# Patient Record
Sex: FEMALE | Race: BLACK OR AFRICAN AMERICAN | Employment: UNEMPLOYED | URBAN - METROPOLITAN AREA
[De-identification: names, ages, dates, MRNs, and addresses within clinical notes are randomized per-mention and may not be internally consistent; named-entity substitution may affect disease eponyms.]

---

## 2018-10-04 ENCOUNTER — OFFICE VISIT (OUTPATIENT)
Dept: DERMATOLOGY | Age: 28
End: 2018-10-04
Payer: MEDICARE

## 2018-10-04 VITALS
HEIGHT: 72 IN | DIASTOLIC BLOOD PRESSURE: 81 MMHG | SYSTOLIC BLOOD PRESSURE: 127 MMHG | OXYGEN SATURATION: 97 % | HEART RATE: 85 BPM | WEIGHT: 215.6 LBS | BODY MASS INDEX: 29.2 KG/M2

## 2018-10-04 DIAGNOSIS — R22.0 MASS OF SCALP: Primary | ICD-10-CM

## 2018-10-04 PROCEDURE — G8419 CALC BMI OUT NRM PARAM NOF/U: HCPCS | Performed by: DERMATOLOGY

## 2018-10-04 PROCEDURE — 1036F TOBACCO NON-USER: CPT | Performed by: DERMATOLOGY

## 2018-10-04 PROCEDURE — G8484 FLU IMMUNIZE NO ADMIN: HCPCS | Performed by: DERMATOLOGY

## 2018-10-04 PROCEDURE — 99202 OFFICE O/P NEW SF 15 MIN: CPT | Performed by: DERMATOLOGY

## 2018-10-04 PROCEDURE — G8427 DOCREV CUR MEDS BY ELIG CLIN: HCPCS | Performed by: DERMATOLOGY

## 2018-10-04 RX ORDER — FLUTICASONE PROPIONATE 50 MCG
SPRAY, SUSPENSION (ML) NASAL
Refills: 1 | COMMUNITY
Start: 2018-08-03 | End: 2018-10-11 | Stop reason: ALTCHOICE

## 2018-10-04 NOTE — PROGRESS NOTES
documented in clinic note below. Acne exam, which includes the head/face, neck, chest, and back was performed    Pertinent Physical Exam Findings:  Physical Exam   Skin:            Medical Necessity of Exam Performed:   Distribution of patient concerns    Additional Diagnostic Testing performed during exam: Not performed ,  Not performed    ASSESSMENT:   Diagnosis Orders   1. Mass of scalp  AFL Jason Limon MD       Plan of Action is as Follows:  Assessment    1. Mass of scalp  - counseled patient that is likely a cyst or lipoma, but almost certainly benign  - observation would be a good option but patients want it gone. Given potential subfrontalis location will refer to plastic surgery  - Jason Limon MD          Patient Instructions   Referral to plastic surgery to remove possible cyst.       Photo surveillance performed: No    Follow-up: No Follow-up on file. This note was created with the assistance of a speech-recognition program.  Although the intention is to generate a document that actually reflects the content of the visit, no guarantees can be provided that every mistake has been identified and corrected by editing.     Electronically signed by Kaci Ulloa MD on 10/4/18 at 3:44 PM

## 2018-10-04 NOTE — LETTER
[facility]) Dermatology   [address] [place] [address]  [address]  [address] [place] [address] [address]  Phone: [phone]  Fax: [phone]     Topher Gonzales MD       October 4, 2018     No referring provider defined for this encounter. Patient: Espinoza Leonard   MR Number: [id number]   YOB: 1990   Date of Visit: 10/4/2018     Dear Dr. Lizzie Villagomez ref. provider found: Thank you for the request for consultation for Ms. Robson Potts to me for evaluation. Below are the relevant portions of my assessment and plan of care. 1. Mass of scalp  - counseled patient that is likely a cyst or lipoma, but almost certainly benign  - observation would be a good option but patients want it gone. Given potential subfrontalis location will refer to plastic surgery  - Irene Walters MD       Patient Instructions   Referral to plastic surgery to remove possible cyst.         If you have questions, please do not hesitate to call me. I look forward to following [place] along with you.     Sincerely,        Topher Gonzales MD

## 2018-10-17 ENCOUNTER — TELEPHONE (OUTPATIENT)
Dept: DERMATOLOGY | Age: 28
End: 2018-10-17

## 2018-10-17 NOTE — TELEPHONE ENCOUNTER
Pt informed and understands. I went to transfer her to scheduling but she hung up on me before I could do that.

## 2018-10-24 ENCOUNTER — TELEPHONE (OUTPATIENT)
Dept: DERMATOLOGY | Age: 28
End: 2018-10-24

## 2018-10-24 DIAGNOSIS — L72.9 SCALP CYST: Primary | ICD-10-CM

## 2020-08-20 PROBLEM — Z00.00 ENCOUNTER FOR PREVENTIVE HEALTH EXAMINATION: Status: ACTIVE | Noted: 2020-08-20

## 2020-08-24 ENCOUNTER — APPOINTMENT (OUTPATIENT)
Dept: NEUROSURGERY | Facility: CLINIC | Age: 30
End: 2020-08-24
Payer: SELF-PAY

## 2020-08-24 VITALS — BODY MASS INDEX: 33.86 KG/M2 | HEIGHT: 72 IN | WEIGHT: 250 LBS

## 2020-08-24 DIAGNOSIS — Z78.9 OTHER SPECIFIED HEALTH STATUS: ICD-10-CM

## 2020-08-24 DIAGNOSIS — Z82.3 FAMILY HISTORY OF STROKE: ICD-10-CM

## 2020-08-24 DIAGNOSIS — H93.A9 PULSATILE TINNITUS, UNSPECIFIED EAR: ICD-10-CM

## 2020-08-24 PROCEDURE — 99202 OFFICE O/P NEW SF 15 MIN: CPT | Mod: 95

## 2020-08-24 NOTE — REASON FOR VISIT
[New Patient Visit] : a new patient visit [Referred By: _________] : Patient was referred by RICKEY [Other: _____] : [unfilled]

## 2020-08-26 NOTE — PHYSICAL EXAM
[General Appearance - Alert] : alert [General Appearance - Well Nourished] : well nourished [General Appearance - In No Acute Distress] : in no acute distress [General Appearance - Well Developed] : well developed [General Appearance - Well-Appearing] : healthy appearing [Oriented To Time, Place, And Person] : oriented to person, place, and time [Impaired Insight] : insight and judgment were intact [Affect] : the affect was normal [Person] : oriented to person [Memory Recent] : recent memory was not impaired [Place] : oriented to place [Time] : oriented to time [Exaggerated Use Of Accessory Muscles For Inspiration] : no accessory muscle use [] : no respiratory distress [FreeTextEntry1] : ringing in ears

## 2020-08-26 NOTE — HISTORY OF PRESENT ILLNESS
[> 3 months] : more  than 3 months [de-identified] : Geeta Mark is a pleasant 30 year old right handed lady who presents for consultation regarding bilateral pulsatile tinnitus which has been present since 2013 however symptom has increased in the past year.  She describes the sounds as "whooshing" and sounds "like I'm under water".  She denies a thrill.  \par \par She states that she had US Duplex of carotid arteries with normal results.  She states that sometimes she thinks that sounds are louder on the right side but she is not sure.\par \par She denies headaches, blurry vision, weight changes.\par \par She thinks that when she tilts her head back or position her head in a "weird way" not to hear it.  When she presses on her neck she hears the sound on the opposite side and stops on the side she is pressing.\par \par She lives in [place], [place].\par \par telehealth visit started at 3:35 pm - 4:18 pm\par  [FreeTextEntry1] : "Pulsatile tinnitus"

## 2020-08-26 NOTE — ASSESSMENT
[FreeTextEntry1] : IMPRESSION:\par 1. Bilateral pulsatile tinnitus\par \par The patient is reluctant to have contrast enhanced studies and would rather have non-contrast scans.\par \par PLAN:\par 1. MRA/MRV w/o contrast.\par 2. Patient will obtain imaging and send to us.

## 2021-02-16 ENCOUNTER — HOSPITAL ENCOUNTER (EMERGENCY)
Age: 31
Discharge: HOME | End: 2021-02-16
Attending: EMERGENCY MEDICINE
Payer: MEDICARE

## 2021-02-16 VITALS
BODY MASS INDEX: 31.15 KG/M2 | RESPIRATION RATE: 18 BRPM | SYSTOLIC BLOOD PRESSURE: 116 MMHG | WEIGHT: 230 LBS | HEIGHT: 72 IN | HEART RATE: 80 BPM | OXYGEN SATURATION: 98 % | DIASTOLIC BLOOD PRESSURE: 83 MMHG | TEMPERATURE: 97.7 F

## 2021-02-16 DIAGNOSIS — R44.3 HALLUCINATIONS: Primary | ICD-10-CM

## 2021-02-16 LAB
ABSOLUTE EOS #: <0.03 K/UL (ref 0–0.44)
ABSOLUTE IMMATURE GRANULOCYTE: <0.03 K/UL (ref 0–0.3)
ABSOLUTE LYMPH #: 1.62 K/UL (ref 1.1–3.7)
ABSOLUTE MONO #: 0.72 K/UL (ref 0.1–1.2)
ACETAMINOPHEN LEVEL: <5 UG/ML (ref 10–30)
ALBUMIN SERPL-MCNC: 4.3 G/DL (ref 3.5–5.2)
ALBUMIN/GLOBULIN RATIO: 1.1 (ref 1–2.5)
ALP BLD-CCNC: 67 U/L (ref 35–104)
ALT SERPL-CCNC: 9 U/L (ref 5–33)
ANION GAP SERPL CALCULATED.3IONS-SCNC: 11 MMOL/L (ref 9–17)
AST SERPL-CCNC: 16 U/L
BASOPHILS # BLD: 0 % (ref 0–2)
BASOPHILS ABSOLUTE: 0.03 K/UL (ref 0–0.2)
BILIRUB SERPL-MCNC: 0.53 MG/DL (ref 0.3–1.2)
BUN BLDV-MCNC: 10 MG/DL (ref 6–20)
BUN/CREAT BLD: ABNORMAL (ref 9–20)
CALCIUM SERPL-MCNC: 9.1 MG/DL (ref 8.6–10.4)
CHLORIDE BLD-SCNC: 102 MMOL/L (ref 98–107)
CO2: 22 MMOL/L (ref 20–31)
CREAT SERPL-MCNC: 0.75 MG/DL (ref 0.5–0.9)
DIFFERENTIAL TYPE: ABNORMAL
EOSINOPHILS RELATIVE PERCENT: 0 % (ref 1–4)
ETHANOL PERCENT: <0.01 %
ETHANOL: <10 MG/DL
GFR AFRICAN AMERICAN: >60 ML/MIN
GFR NON-AFRICAN AMERICAN: >60 ML/MIN
GFR SERPL CREATININE-BSD FRML MDRD: ABNORMAL ML/MIN/{1.73_M2}
GFR SERPL CREATININE-BSD FRML MDRD: ABNORMAL ML/MIN/{1.73_M2}
GLUCOSE BLD-MCNC: 105 MG/DL (ref 70–99)
HCG QUALITATIVE: NEGATIVE
HCT VFR BLD CALC: 35.3 % (ref 36.3–47.1)
HEMOGLOBIN: 12.4 G/DL (ref 11.9–15.1)
IMMATURE GRANULOCYTES: 0 %
LYMPHOCYTES # BLD: 16 % (ref 24–43)
MAGNESIUM: 2.1 MG/DL (ref 1.6–2.6)
MCH RBC QN AUTO: 28.4 PG (ref 25.2–33.5)
MCHC RBC AUTO-ENTMCNC: 35.1 G/DL (ref 28.4–34.8)
MCV RBC AUTO: 80.8 FL (ref 82.6–102.9)
MONOCYTES # BLD: 7 % (ref 3–12)
NRBC AUTOMATED: 0 PER 100 WBC
PDW BLD-RTO: 13.3 % (ref 11.8–14.4)
PLATELET # BLD: 223 K/UL (ref 138–453)
PLATELET ESTIMATE: ABNORMAL
PMV BLD AUTO: 11.5 FL (ref 8.1–13.5)
POTASSIUM SERPL-SCNC: 3.2 MMOL/L (ref 3.7–5.3)
RBC # BLD: 4.37 M/UL (ref 3.95–5.11)
RBC # BLD: ABNORMAL 10*6/UL
SALICYLATE LEVEL: <1 MG/DL (ref 3–10)
SEG NEUTROPHILS: 77 % (ref 36–65)
SEGMENTED NEUTROPHILS ABSOLUTE COUNT: 7.65 K/UL (ref 1.5–8.1)
SODIUM BLD-SCNC: 135 MMOL/L (ref 135–144)
TOTAL PROTEIN: 8.2 G/DL (ref 6.4–8.3)
TOXIC TRICYCLIC SC,BLOOD: NEGATIVE
WBC # BLD: 10.1 K/UL (ref 3.5–11.3)
WBC # BLD: ABNORMAL 10*3/UL

## 2021-02-16 PROCEDURE — 80143 DRUG ASSAY ACETAMINOPHEN: CPT

## 2021-02-16 PROCEDURE — 83735 ASSAY OF MAGNESIUM: CPT

## 2021-02-16 PROCEDURE — 85025 COMPLETE CBC W/AUTO DIFF WBC: CPT

## 2021-02-16 PROCEDURE — 84703 CHORIONIC GONADOTROPIN ASSAY: CPT

## 2021-02-16 PROCEDURE — 80179 DRUG ASSAY SALICYLATE: CPT

## 2021-02-16 PROCEDURE — 80053 COMPREHEN METABOLIC PANEL: CPT

## 2021-02-16 PROCEDURE — 99284 EMERGENCY DEPT VISIT MOD MDM: CPT

## 2021-02-16 PROCEDURE — G0480 DRUG TEST DEF 1-7 CLASSES: HCPCS

## 2021-02-16 PROCEDURE — 80307 DRUG TEST PRSMV CHEM ANLYZR: CPT

## 2021-02-16 ASSESSMENT — ENCOUNTER SYMPTOMS
VOMITING: 0
DIARRHEA: 0
WHEEZING: 0
BACK PAIN: 1
SHORTNESS OF BREATH: 0
NAUSEA: 0

## 2021-02-16 NOTE — ED NOTES
Patient and father out at nurse station.   States that      Marleen, [employer], RN  02/16/21 4036

## 2021-02-16 NOTE — ED PROVIDER NOTES
[facility]     Emergency Department     Faculty Attestation    I performed a history and physical examination of the patient and discussed management with the resident. I reviewed the residents note and agree with the documented findings and plan of care. Any areas of disagreement are noted on the chart. I was personally present for the key portions of any procedures. I have documented in the chart those procedures where I was not present during the key portions. I have reviewed the emergency nurses triage note. I agree with the chief complaint, past medical history, past surgical history, allergies, medications, social and family history as documented unless otherwise noted below. For Physician Assistant/ Nurse Practitioner cases/documentation I have personally evaluated this patient and have completed at least one if not all key elements of the E/M (history, physical exam, and MDM). Additional findings are as noted. I have personally seen and evaluated the patient. I find the patient's history and physical exam are consistent with the NP/PA documentation. I agree with the care provided, treatment rendered, disposition and follow-up plan. 51-year-old female presenting for mental health evaluation. Also complaining of intermittent back pain. Believes that she may have a kidney stone, no history of the same. No dysuria. No back pain currently. Also asking for a brain MRI to make sure \"everything is okay\". No headaches or dizziness. Hearing voices. Exam:  General: Laying on the bed and with anxious affect  CV: normal rate and regular rhythm  Lungs: Breathing comfortably on room air with no tachypnea, hypoxia, or increased work of breathing    Plan:  Labs including CBC, electrolytes, ED tox, UA, urine pregnancy test  Anticipate admission to inpatient psychiatric facility    Labs with no significant abnormalities.   Patient wanted to leave, patient's parents are also in the room. Concern for patient's ability to care for herself given paranoia. Resident discussed risks and benefits of leaving and patient was discharged AMA in the care of her parents.         Tram Armenta MD   Attending Emergency  Physician    (Please note that portions of this note were completed with a voice recognition program. Efforts were made to edit the dictations but occasionally words are mis-transcribed.)              Tram Armenta MD  02/16/21 1740

## 2021-02-16 NOTE — ED PROVIDER NOTES
[facility]  ED  Emergency Department Encounter  EmergencyMedicine Resident     Pt James Shane  MRN: [id number]  [place] 1990  Date of evaluation: 2/16/21  PCP:  Gabby Garzon DO    CHIEF COMPLAINT       Chief Complaint   Patient presents with    Psychiatric Evaluation     hearing voices that others don't hear, anxious, past psyc history, very paranoid    Back Pain     lower mid \"wants mri\"        HISTORY OF PRESENT ILLNESS  (Location/Symptom, Timing/Onset, Context/Setting, Quality, Duration, Modifying Factors, Severity.)      Vashti Newell is a 31 y.o. female history of prior psychiatric admission for acute psychosis presenting today with multiple complaints including auditory and visual hallucinations. Patient states that she has been seeing people at her house that are not actually there, stating that they have been intermittently talking her. Patient also reporting hearing strange tapping noises on her windows and doors outside. Patient also states that she is having obsessive thoughts about legally changing her name. Patient has past history of previous psychiatric admission at which time she was diagnosed with nonspecific psychosis. Denies any suicidal or homicidal ideations at this time. Patient also stating that she has back pain and believes she has a kidney stone because she was once told that back pain often occurs with kidney stones. Denies any fever, chills, hematuria, dysuria, vaginal bleeding, vaginal discharge, nausea, vomiting, diarrhea    PAST MEDICAL / SURGICAL / SOCIAL / FAMILY HISTORY      has a past medical history of Body piercing, Uses contact lenses, and Wears glasses. has a past surgical history that includes Hand surgery (Left, 2010) and Frenulectomy.       Social History     Socioeconomic History    Marital status: Single     Spouse name: Not on file    Number of children: Not on file    Years of education: Not on file    Highest education level: Not on file   Occupational History    Not on file   Social Needs    Financial resource strain: Not on file    Food insecurity     Worry: Not on file     Inability: Not on file    Transportation needs     Medical: Not on file     Non-medical: Not on file   Tobacco Use    Smoking status: Never Smoker    Smokeless tobacco: Never Used   Substance and Sexual Activity    Alcohol use: No    Drug use: Not Currently     Types: Marijuana     Comment: occasional marijuana smoking or eating    Sexual activity: Yes     Partners: Male   Lifestyle    Physical activity     Days per week: Not on file     Minutes per session: Not on file    Stress: Not on file   Relationships    Social connections     Talks on phone: Not on file     Gets together: Not on file     Attends Anabaptism service: Not on file     Active member of club or organization: Not on file     Attends meetings of clubs or organizations: Not on file     Relationship status: Not on file    Intimate partner violence     Fear of current or ex partner: Not on file     Emotionally abused: Not on file     Physically abused: Not on file     Forced sexual activity: Not on file   Other Topics Concern    Not on file   Social History Narrative    Not on file       Family History   Problem Relation Age of Onset    Breast Cancer Other     Lung Cancer Other     Heart Disease Other        Allergies:  Latex, Honey, and Bee venom    Home Medications:  Prior to Admission medications    Medication Sig Start Date End Date Taking? Authorizing Provider   cephALEXin (KEFLEX) 250 MG capsule Take one PO QID. Start day before surgery. 7/24/19   Rodrigo Hilario MD   Misc Natural Products (HERBAL ENERGY COMPLEX PO) Take 1 capsule by mouth daily [facility] (active hexose correlated compound, from mushroom) capsules    Historical Provider, MD   lurasidone (LATUDA) 40 MG TABS tablet Take 1 tablet by mouth daily.  4/21/14   Deanne Villarreal MD       REVIEW OF SYSTEMS (2-9 systems for level 4, 10 or more for level 5)      Review of Systems   Constitutional: Negative for fatigue and fever. Eyes: Negative for visual disturbance. Respiratory: Negative for shortness of breath and wheezing. Cardiovascular: Negative for chest pain and leg swelling. Gastrointestinal: Negative for diarrhea, nausea and vomiting. Endocrine: Negative for polydipsia and polyuria. Genitourinary: Negative for dysuria, flank pain and pelvic pain. Musculoskeletal: Positive for back pain. Negative for neck pain and neck stiffness. Skin: Negative for rash and wound. Neurological: Negative for seizures, weakness, numbness and headaches. Psychiatric/Behavioral: Positive for hallucinations. Negative for confusion. The patient is nervous/anxious. PHYSICAL EXAM   (up to 7 for level 4, 8 or more for level 5)      INITIAL VITALS:   /83   Pulse 80   Temp 97.7 °F (36.5 °C) (Infrared)   Resp 18   Ht 6' (1.829 m)   Wt 230 lb (104.3 kg)   LMP 02/01/2021 (Approximate)   SpO2 98%   BMI 31.19 kg/m²     Physical Exam  Constitutional:       General: She is not in acute distress. Appearance: She is well-developed. She is not diaphoretic. HENT:      Head: Normocephalic. Right Ear: External ear normal.      Left Ear: External ear normal.      Mouth/Throat:      Pharynx: No oropharyngeal exudate. Eyes:      General: No scleral icterus. Right eye: No discharge. Left eye: No discharge. Pupils: Pupils are equal, round, and reactive to light. Neck:      Musculoskeletal: Normal range of motion and neck supple. Vascular: No JVD. Cardiovascular:      Rate and Rhythm: Normal rate and regular rhythm. Heart sounds: No murmur. No friction rub. No gallop. Pulmonary:      Effort: Pulmonary effort is normal. No respiratory distress. Breath sounds: Normal breath sounds. No stridor. No wheezing or rales. Chest:      Chest wall: No tenderness. Abdominal:      General: There is no distension. Palpations: Abdomen is soft. Tenderness: There is no abdominal tenderness. There is no guarding or rebound. Musculoskeletal: Normal range of motion. General: No tenderness or deformity. Skin:     General: Skin is warm and dry. Capillary Refill: Capillary refill takes less than 2 seconds. Coloration: Skin is not pale. Findings: No erythema or rash. Neurological:      Mental Status: She is alert and oriented to person, place, and time. Sensory: No sensory deficit. Motor: No abnormal muscle tone. Coordination: Coordination normal.      Gait: Gait normal.   Psychiatric:         Mood and Affect: Mood is anxious. Speech: Speech is rapid and pressured and tangential.         Behavior: Behavior normal.         Thought Content: Thought content normal. Thought content does not include homicidal or suicidal ideation. Thought content does not include homicidal or suicidal plan. DIFFERENTIAL  DIAGNOSIS     PLAN (LABS / IMAGING / EKG):  Orders Placed This Encounter   Procedures    CBC Auto Differential    Comprehensive Metabolic Panel w/ Reflex to MG    Urinalysis Reflex to Culture    Urine Drug Screen    HCG Qualitative, Serum    TOX SCR, BLD, ED    Magnesium       MEDICATIONS ORDERED:  No orders of the defined types were placed in this encounter.       DDX: Acute psychosis, schizophrenia, bipolar disorder, intoxication, delirium, substance abuse    DIAGNOSTIC RESULTS / [facility]   LAB RESULTS:  Results for orders placed or performed during the hospital encounter of 02/16/21   CBC Auto Differential   Result Value Ref Range    WBC 10.1 3.5 - 11.3 k/uL    RBC 4.37 3.95 - 5.11 m/uL    Hemoglobin 12.4 11.9 - 15.1 g/dL    Hematocrit 35.3 (L) 36.3 - 47.1 %    MCV 80.8 (L) 82.6 - 102.9 fL    MCH 28.4 25.2 - 33.5 pg    MCHC 35.1 (H) 28.4 - 34.8 g/dL    RDW 13.3 11.8 - 14.4 %    Platelets 223 138 - 453 k/uL    MPV 11.5 8.1 - 13.5 fL    NRBC Automated 0.0 0.0 per 100 WBC    Differential Type NOT REPORTED     Seg Neutrophils 77 (H) 36 - 65 %    Lymphocytes 16 (L) 24 - 43 %    Monocytes 7 3 - 12 %    Eosinophils % 0 (L) 1 - 4 %    Basophils 0 0 - 2 %    Immature Granulocytes 0 0 %    Segs Absolute 7.65 1.50 - 8.10 k/uL    Absolute Lymph # 1.62 1.10 - 3.70 k/uL    Absolute Mono # 0.72 0.10 - 1.20 k/uL    Absolute Eos # <0.03 0.00 - 0.44 k/uL    Basophils Absolute 0.03 0.00 - 0.20 k/uL    Absolute Immature Granulocyte <0.03 0.00 - 0.30 k/uL    WBC Morphology NOT REPORTED     RBC Morphology MICROCYTOSIS PRESENT     Platelet Estimate NOT REPORTED    Comprehensive Metabolic Panel w/ Reflex to MG   Result Value Ref Range    Glucose 105 (H) 70 - 99 mg/dL    BUN 10 6 - 20 mg/dL    CREATININE 0.75 0.50 - 0.90 mg/dL    Bun/Cre Ratio NOT REPORTED 9 - 20    Calcium 9.1 8.6 - 10.4 mg/dL    Sodium 135 135 - 144 mmol/L    Potassium 3.2 (L) 3.7 - 5.3 mmol/L    Chloride 102 98 - 107 mmol/L    CO2 22 20 - 31 mmol/L    Anion Gap 11 9 - 17 mmol/L    Alkaline Phosphatase 67 35 - 104 U/L    ALT 9 5 - 33 U/L    AST 16 <32 U/L    Total Bilirubin 0.53 0.3 - 1.2 mg/dL    Total Protein 8.2 6.4 - 8.3 g/dL    Albumin 4.3 3.5 - 5.2 g/dL    Albumin/Globulin Ratio 1.1 1.0 - 2.5    GFR Non-African American >60 >60 mL/min    GFR African American >60 >60 mL/min    GFR Comment          GFR Staging NOT REPORTED    HCG Qualitative, Serum   Result Value Ref Range    hCG Qual NEGATIVE NEGATIVE   TOX SCR, BLD, ED   Result Value Ref Range    Acetaminophen Level <5 (L) 10 - 30 ug/mL    Ethanol <10 <10 mg/dL    Ethanol percent <4.428 <1.444 %    Salicylate Lvl <1 (L) 3 - 10 mg/dL    Toxic Tricyclic Sc,Blood NEGATIVE NEGATIVE   Magnesium   Result Value Ref Range    Magnesium 2.1 1.6 - 2.6 mg/dL       IMPRESSION: 29-year-old female who appears to be an acute psychotic episode with auditory and visual hallucinations.   No suicidal or homicidal ideations. Vital signs are within normal limits and patient is afebrile. Patient is physically in no acute distress but is very anxious with rapid, pressured speech. Plan for social work consult, likely psychiatric admission. EMERGENCY DEPARTMENT COURSE:  Labs unremarkable. Social work spoke with patient, due to current road conditions due to inclement weather there is a delayed transport time to outlying psychiatric facilities. Patient does not have decisional making capacity at this time however does not appear to be an acute threat to herself or others as she is adamant that she has no suicidal or homicidal ideations. Patient is present with her father who does display appropriate decision-making capacity and states that he is okay with assuming her care. While I was in the room with another patient Dr. Afshan Fields spoke to patient and her father about risk of discharge. Patient's father stated that he would take her to [facility] for evaluation and has had inpatient psychiatry there. They were instructed to return to the emergency department here if they wish for further care or transfer to patient psychiatric facility. Patient left AGAINST MEDICAL ADVICE under the care of her father. PROCEDURES:  None    CONSULTS:  None    CRITICAL CARE:  Please see attending note    FINAL IMPRESSION      1. Hallucinations          DISPOSITION / PLAN     DISPOSITION [place] 02/16/2021 09:49:45 AM      PATIENT REFERRED TO:  No follow-up provider specified.     DISCHARGE MEDICATIONS:  Discharge Medication List as of 2/16/2021 10:09 AM          Charmaine Nascimento DO  Emergency Medicine Resident    (Please note that portions of thisnote were completed with a voice recognition program.  Efforts were made to edit the dictations but occasionally words are mis-transcribed.)        Charmaine Nascimento DO  Resident  02/16/21 [id number]

## 2021-02-16 NOTE — ED NOTES
Pt to ED from home with family with c/o back pain and \"Hearing sounds that no one else can\"  According to family, pt has history of mental health issues but unsure what. Father states pt is \"Having a mental breakdown\"  Pt states she does not want any medications because she is a [place]. Pt exhibiting flight of ideas and does behave appropriately for age. Pt repeatedly states she is an adult and wants to be her own POA. Pt alert and oriented x4 in no signs of acute distress.       Iain Gee RN  02/16/21 1622

## 2021-02-16 NOTE — PROGRESS NOTES
Discussed with patient's mother and father leaving at this time. The mother and father requesting to take the patient home at this time and will be assuming care. Discussed at length risk of leaving including safety issues as the patient does not appear to be able to care for self. The parents will be taking her to their home and returning if her condition worsens. The parents comfortable taking care of patient at home at this time. Parents with decisional making capacity.        2/16/2021  Sandy Livingston, PGY3

## 2021-02-17 ENCOUNTER — HOSPITAL ENCOUNTER (INPATIENT)
Age: 31
LOS: 2 days | Discharge: HOME | DRG: 751 | End: 2021-02-19
Attending: EMERGENCY MEDICINE | Admitting: PSYCHIATRY & NEUROLOGY
Payer: MEDICARE

## 2021-02-17 DIAGNOSIS — F29 PSYCHOSIS, UNSPECIFIED PSYCHOSIS TYPE (HCC): Primary | ICD-10-CM

## 2021-02-17 PROBLEM — F23 ACUTE PSYCHOSIS (HCC): Status: ACTIVE | Noted: 2021-02-17

## 2021-02-17 LAB
SARS-COV-2, RAPID: NOT DETECTED
SPECIMEN DESCRIPTION: NORMAL

## 2021-02-17 PROCEDURE — U0002 COVID-19 LAB TEST NON-CDC: HCPCS

## 2021-02-17 PROCEDURE — 1240000000 HC EMOTIONAL WELLNESS R&B

## 2021-02-17 PROCEDURE — 6370000000 HC RX 637 (ALT 250 FOR IP): Performed by: PSYCHIATRY & NEUROLOGY

## 2021-02-17 PROCEDURE — 6360000002 HC RX W HCPCS: Performed by: PSYCHIATRY & NEUROLOGY

## 2021-02-17 PROCEDURE — 99282 EMERGENCY DEPT VISIT SF MDM: CPT

## 2021-02-17 PROCEDURE — 99223 1ST HOSP IP/OBS HIGH 75: CPT | Performed by: PSYCHIATRY & NEUROLOGY

## 2021-02-17 PROCEDURE — 6370000000 HC RX 637 (ALT 250 FOR IP): Performed by: EMERGENCY MEDICINE

## 2021-02-17 PROCEDURE — 6370000000 HC RX 637 (ALT 250 FOR IP): Performed by: INTERNAL MEDICINE

## 2021-02-17 PROCEDURE — 99253 IP/OBS CNSLTJ NEW/EST LOW 45: CPT | Performed by: INTERNAL MEDICINE

## 2021-02-17 PROCEDURE — 6360000002 HC RX W HCPCS: Performed by: EMERGENCY MEDICINE

## 2021-02-17 PROCEDURE — 2580000003 HC RX 258: Performed by: PSYCHIATRY & NEUROLOGY

## 2021-02-17 PROCEDURE — 96372 THER/PROPH/DIAG INJ SC/IM: CPT

## 2021-02-17 RX ORDER — TRAZODONE HYDROCHLORIDE 50 MG/1
50 TABLET ORAL NIGHTLY PRN
Status: DISCONTINUED | OUTPATIENT
Start: 2021-02-17 | End: 2021-02-19 | Stop reason: HOSPADM

## 2021-02-17 RX ORDER — LORAZEPAM 1 MG/1
2 TABLET ORAL EVERY 4 HOURS PRN
Status: DISCONTINUED | OUTPATIENT
Start: 2021-02-17 | End: 2021-02-19 | Stop reason: HOSPADM

## 2021-02-17 RX ORDER — HYDROXYZINE HYDROCHLORIDE 25 MG/1
50 TABLET, FILM COATED ORAL 3 TIMES DAILY PRN
Status: DISCONTINUED | OUTPATIENT
Start: 2021-02-17 | End: 2021-02-19 | Stop reason: HOSPADM

## 2021-02-17 RX ORDER — MAGNESIUM HYDROXIDE/ALUMINUM HYDROXICE/SIMETHICONE 120; 1200; 1200 MG/30ML; MG/30ML; MG/30ML
30 SUSPENSION ORAL EVERY 6 HOURS PRN
Status: DISCONTINUED | OUTPATIENT
Start: 2021-02-17 | End: 2021-02-19 | Stop reason: HOSPADM

## 2021-02-17 RX ORDER — ACETAMINOPHEN 325 MG/1
650 TABLET ORAL EVERY 4 HOURS PRN
Status: DISCONTINUED | OUTPATIENT
Start: 2021-02-17 | End: 2021-02-19 | Stop reason: HOSPADM

## 2021-02-17 RX ORDER — HALOPERIDOL 5 MG
5 TABLET ORAL EVERY 4 HOURS PRN
Status: DISCONTINUED | OUTPATIENT
Start: 2021-02-17 | End: 2021-02-19 | Stop reason: HOSPADM

## 2021-02-17 RX ORDER — POLYETHYLENE GLYCOL 3350 17 G/17G
17 POWDER, FOR SOLUTION ORAL DAILY PRN
Status: DISCONTINUED | OUTPATIENT
Start: 2021-02-17 | End: 2021-02-19 | Stop reason: HOSPADM

## 2021-02-17 RX ORDER — LORAZEPAM 1 MG/1
2 TABLET ORAL ONCE
Status: COMPLETED | OUTPATIENT
Start: 2021-02-17 | End: 2021-02-17

## 2021-02-17 RX ORDER — IBUPROFEN 400 MG/1
400 TABLET ORAL EVERY 6 HOURS PRN
Status: DISCONTINUED | OUTPATIENT
Start: 2021-02-17 | End: 2021-02-19 | Stop reason: HOSPADM

## 2021-02-17 RX ORDER — DIPHENHYDRAMINE HYDROCHLORIDE 50 MG/ML
25 INJECTION INTRAMUSCULAR; INTRAVENOUS EVERY 4 HOURS PRN
Status: DISCONTINUED | OUTPATIENT
Start: 2021-02-17 | End: 2021-02-19 | Stop reason: HOSPADM

## 2021-02-17 RX ORDER — HALOPERIDOL 5 MG/ML
5 INJECTION INTRAMUSCULAR ONCE
Status: COMPLETED | OUTPATIENT
Start: 2021-02-17 | End: 2021-02-17

## 2021-02-17 RX ORDER — LORAZEPAM 2 MG/ML
2 INJECTION INTRAMUSCULAR ONCE
Status: DISCONTINUED | OUTPATIENT
Start: 2021-02-17 | End: 2021-02-19 | Stop reason: HOSPADM

## 2021-02-17 RX ORDER — POTASSIUM CHLORIDE 20 MEQ/1
20 TABLET, EXTENDED RELEASE ORAL 2 TIMES DAILY WITH MEALS
Status: DISCONTINUED | OUTPATIENT
Start: 2021-02-17 | End: 2021-02-19 | Stop reason: HOSPADM

## 2021-02-17 RX ORDER — HALOPERIDOL 5 MG/ML
5 INJECTION INTRAMUSCULAR EVERY 4 HOURS PRN
Status: DISCONTINUED | OUTPATIENT
Start: 2021-02-17 | End: 2021-02-19 | Stop reason: HOSPADM

## 2021-02-17 RX ORDER — ZIPRASIDONE MESYLATE 20 MG/ML
20 INJECTION, POWDER, LYOPHILIZED, FOR SOLUTION INTRAMUSCULAR EVERY 12 HOURS PRN
Status: DISCONTINUED | OUTPATIENT
Start: 2021-02-17 | End: 2021-02-19 | Stop reason: HOSPADM

## 2021-02-17 RX ORDER — RISPERIDONE 1 MG/1
1 TABLET, FILM COATED ORAL 2 TIMES DAILY
Status: DISCONTINUED | OUTPATIENT
Start: 2021-02-17 | End: 2021-02-18

## 2021-02-17 RX ADMIN — POTASSIUM CHLORIDE 20 MEQ: 1500 TABLET, EXTENDED RELEASE ORAL at 17:29

## 2021-02-17 RX ADMIN — LORAZEPAM 2 MG: 1 TABLET ORAL at 01:55

## 2021-02-17 RX ADMIN — RISPERIDONE 1 MG: 1 TABLET, FILM COATED ORAL at 22:41

## 2021-02-17 RX ADMIN — RISPERIDONE 1 MG: 1 TABLET, FILM COATED ORAL at 14:51

## 2021-02-17 RX ADMIN — RISPERIDONE 1 MG: 1 TABLET, FILM COATED ORAL at 21:15

## 2021-02-17 RX ADMIN — HALOPERIDOL LACTATE 5 MG: 5 INJECTION, SOLUTION INTRAMUSCULAR at 21:07

## 2021-02-17 RX ADMIN — WATER 1.2 ML: 1 INJECTION INTRAMUSCULAR; INTRAVENOUS; SUBCUTANEOUS at 23:12

## 2021-02-17 RX ADMIN — HALOPERIDOL 5 MG: 5 INJECTION INTRAMUSCULAR at 02:45

## 2021-02-17 RX ADMIN — ZIPRASIDONE MESYLATE 20 MG: 20 INJECTION, POWDER, LYOPHILIZED, FOR SOLUTION INTRAMUSCULAR at 23:12

## 2021-02-17 RX ADMIN — DIPHENHYDRAMINE HYDROCHLORIDE 25 MG: 50 INJECTION, SOLUTION INTRAMUSCULAR; INTRAVENOUS at 21:07

## 2021-02-17 ASSESSMENT — PAIN SCALES - GENERAL: PAINLEVEL_OUTOF10: 0

## 2021-02-17 ASSESSMENT — ENCOUNTER SYMPTOMS
GASTROINTESTINAL NEGATIVE: 1
RESPIRATORY NEGATIVE: 1

## 2021-02-17 ASSESSMENT — SLEEP AND FATIGUE QUESTIONNAIRES
AVERAGE NUMBER OF SLEEP HOURS: 1
DIFFICULTY STAYING ASLEEP: YES
DO YOU HAVE DIFFICULTY SLEEPING: YES

## 2021-02-17 NOTE — H&P
HISTORY and [facility] [address]       NAME:  Yumiko Claudio  MRN: [id number]   YOB: 1990   Date: 2/17/2021   Age: 31 y.o. Gender: female     COMPLAINT AND PRESENT HISTORY:    Brief psychiatric summary (primary reason for current hospital admission):  Patient originally presented to [address] on 2-16-21 w/hallucinations. Per emergency department notes, patient presented with auditory and visual hallucinations, as well as obsessive thoughts about legally changing her name. Due to weather conditions, there was a delay transport time to outlying psychiatric facilities. Patient was present with her father who was agreeable to assuming her care. However physician felt that there were risk of patient being discharged, and ultimately left [address] Patient's father was agreeable to taking her to [facility] for further evaluation. Patient presented to [facility] emergency department on February 17, 2021. Per her father patient experienced psychosis in 2014 and was hospitalized at that time as well. Patient was given Ativan and Haldol while in the emergency department. Lab work completed the day before was significant for a potassium of 3.2. Interview with writer today addressing reason for admission:  Patient is interviewed in her room today, she is in bed and appears to be somewhat lethargic but easily arousable. She is A&O x3. She states that she \"needs water and medication\". When asked what brought patient into the hospital, she states that she was tired and needed to sleep. She denies depression. When asked how her mood was today, she states that her mood is good. Per patient interview today, patient denies any thoughts/plans of harming themselves or others currently. Denies hallucinations. Sleeping and eating well without complaints. She states that she was not following with psychiatry prior to admission, was not taking any psychiatric medications.   It is noted that she was previously admitted to the psychiatric unit in 2014 for AMS. She states that she has been was living with herself and a \"guest\" prior to admission and plans on returning here. Per chart, psychiatric history only includes psychosis. She states that she has never been a smoker, denies alcohol use, does admit to marijuana use. Review of past/current medical concerns:  Patient denies any pain today or any other somatic complaints. Allergies were reviewed, per care everywhere patient has an IV contrast allergy listed. Patient is not sure if she has a true allergy to IV contrast, she believes maybe her mother has an allergy. She will not offer any additional information on whether or not she has received IV contrast in the past.  Confirmed that patient does have a history of kidney stones and sickle cell trait. She also states that she did receive liposuction in December 2020 to her stomach. See psychiatric admission note for further psychiatric history. DIAGNOSTIC RESULTS   Labs:  CBC:   Recent Labs     02/16/21  0842   WBC 10.1   HGB 12.4        BMP:    Recent Labs     02/16/21  0842      K 3.2*      CO2 22   BUN 10   CREATININE 0.75   GLUCOSE 105*     Hepatic:   Recent Labs     02/16/21  0842   AST 16   ALT 9   BILITOT 0.53   ALKPHOS 67     Lipids: No results for input(s): CHOL, HDL in the last 72 hours. Invalid input(s): LDLCALCU  Thyroid: No results for input(s): N1UXTKT, U7PPTRT, FT4, TSH in the last 72 hours.    U/A:  Lab Results   Component Value Date    COLORU YELLOW 04/17/2014    WBCUA None 04/17/2014    RBCUA None 04/17/2014    MUCUS 1+ 04/17/2014    BACTERIA FEW 04/17/2014    SPECGRAV 1.027 04/17/2014    LEUKOCYTESUR NEGATIVE 04/17/2014    GLUCOSEU NEGATIVE 04/17/2014    AMORPHOUS NOT REPORTED 04/17/2014       PAST MEDICAL HISTORY     Past Medical History:   Diagnosis Date    Acute psychosis ([address] 02/17/2021    Body piercing     ears: helix and lobes  Hypokalemia 02/17/2021    Kidney stones     Sickle cell trait ([address]     Wears glasses        SURGICAL HISTORY       Past Surgical History:   Procedure Laterality Date    FRENULECTOMY      tongue procedure    HAND SURGERY Left 2010    removal of cyst of dorsum of hand    LIPOSUCTION  12/04/2020    ABD       FAMILY HISTORY       Family History   Problem Relation Age of Onset    Breast Cancer Maternal Grandmother     Lung Cancer Maternal Grandmother     Heart Disease Maternal Grandmother        SOCIAL HISTORY       Social History     Socioeconomic History    Marital status: Single     Spouse name: None    Number of children: None    Years of education: None    Highest education level: None   Occupational History    None   Social Needs    Financial resource strain: None    Food insecurity     Worry: None     Inability: None    Transportation needs     Medical: None     Non-medical: None   Tobacco Use    Smoking status: Never Smoker    Smokeless tobacco: Never Used   Substance and Sexual Activity    Alcohol use: No    Drug use: Not Currently     Types: Marijuana     Comment: occasional marijuana smoking or eating    Sexual activity: Yes     Partners: Male   Lifestyle    Physical activity     Days per week: None     Minutes per session: None    Stress: None   Relationships    Social connections     Talks on phone: None     Gets together: None     Attends Zoroastrianism service: None     Active member of club or organization: None     Attends meetings of clubs or organizations: None     Relationship status: None    Intimate partner violence     Fear of current or ex partner: None     Emotionally abused: None     Physically abused: None     Forced sexual activity: None   Other Topics Concern    None   Social History Narrative    None           REVIEW OF SYSTEMS      Allergies   Allergen Reactions    Latex Hives and Other (See Comments)    Honey Hives     Pt.states she broke out in hives when raw honey was applied.  Bee Venom Swelling    Contrast [Iodides]      Abstracted from [facility]- patient could not confirm this allergy with me on 2-17-21- states she is not sure. SP 2-17-21. No current facility-administered medications on file prior to encounter. No current outpatient medications on file prior to encounter. Review of Systems   Constitutional: Negative. Negative for appetite change. HENT: Negative. Respiratory: Negative. Cardiovascular: Negative. Gastrointestinal: Negative. Genitourinary: Negative. Psychiatric/Behavioral: Positive for behavioral problems. Negative for sleep disturbance and suicidal ideas. GENERAL PHYSICAL EXAM:     Vitals: BP (!) 151/91   Pulse 108   Temp 98.7 °F (37.1 °C) (Oral)   Resp 16   Ht 6' 1\" (1.854 m)   Wt 235 lb (106.6 kg)   LMP 02/01/2021 (Approximate)   SpO2 100%   BMI 31.00 kg/m²  Body mass index is 31 kg/m². GENERAL APPEARANCE:  Guera Schneider is 31 y.o., female, mildly obese, nourished, conscious, alert. Does not appear to be distress or pain at this time. Physical Exam   Constitutional: She is oriented to person, place, and time. She appears well-developed and well-nourished. She appears lethargic. She is easily aroused. HENT:   Head: Normocephalic and atraumatic. Right Ear: External ear normal.   Left Ear: External ear normal.   Nose: Nose normal.   Mouth/Throat: Oropharynx is clear and moist and mucous membranes are normal.   Eyes: Pupils are equal, round, and reactive to light. Conjunctivae are normal. Right eye exhibits no discharge. Left eye exhibits no discharge. Neck: Normal range of motion. Cardiovascular: Regular rhythm, normal heart sounds and intact distal pulses. Tachycardia present. Exam reveals no gallop and no friction rub. No murmur heard. Pulmonary/Chest: Effort normal and breath sounds normal. No respiratory distress. She has no wheezes. She has no rales.    Abdominal: Soft. Bowel sounds are normal. She exhibits no distension and no mass. There is no abdominal tenderness. There is no rebound and no guarding. Musculoskeletal: Normal range of motion. Right lower leg: Normal. She exhibits no tenderness and no swelling. Left lower leg: Normal. She exhibits no tenderness and no swelling. Neurological: She is oriented to person, place, and time and easily aroused. She has normal strength. She appears lethargic. No cranial nerve deficit. GCS eye subscore is 4. GCS verbal subscore is 5. GCS motor subscore is 6. The patient is conscious, alert, oriented, gait and balance WNL. No apparent focal sensory deficits. No motor deficits, muscle strength equal b/l to UE's and LE's. No facial droop, tongue protrudes centrally, no slurring of the speech. Cranial nerves 3-12 reveal no deficits, taste and smell not assessed. Skin: Skin is warm and dry. Psychiatric: Her speech is normal. She is withdrawn. She expresses no homicidal and no suicidal ideation. She expresses no suicidal plans and no homicidal plans. Vitals reviewed. PROVISIONAL DIAGNOSES:      Active Problems:    Acute psychosis ([facility]    Hypokalemia  Resolved Problems:    * No resolved hospital problems. *      ASSESSMENT & PLAN:   1. Acute psychosis: Recommend con't current tx plan per psychiatry- medications to be reviewed per attending and con't per admitting service, con't VS monitoring per unit protocol    2.  Hypokalemia:  Internal medicine consulted for further evaluation/tx    3. BP elevation: Slightly elevated BP of 151/91 noted at time of assessment, but BP was normal as of 2-16-21 [facility], APRN - CNP on 2/17/2021 at 3:10 PM

## 2021-02-17 NOTE — GROUP NOTE
Group Therapy Note    Date: 2/17/2021    Group Start Time: 1000  Group End Time: 6121  Group Topic: Psychoeducation    KEV Bergman, BEREKETS    Group Therapy Note    Attendees: 14    Patient's Goal:  Pt will identify practices in gratitude and benefits of gratitude for mental health    Notes:  Pt attended group and participated. Pt needed redirection to stay on task. Pt appears to have tangential thoughts and makes loose associations.       Status After Intervention:  Unchanged    Participation Level: Interactive and Monopolizing    Participation Quality: Appropriate, Sharing and Intrusive      Speech:  normal      Thought Process/Content: Delusional  Flight of ideas      Affective Functioning: Congruent      Mood: elevated      Level of consciousness:  Alert and Preoccupied      Response to Learning: Able to verbalize current knowledge/experience and Able to verbalize/acknowledge new learning      Endings: None Reported    Modes of Intervention: Education, Support, Socialization and Exploration      Discipline Responsible: Psychoeducational Specialist      Signature:  Orestes Mejias [place]

## 2021-02-17 NOTE — GROUP NOTE
Group Therapy Note    Date: 2/17/2021    Group Start Time: 0900  Group End Time: 0920  Group Topic: [facility] Therapy Note    Attendees:10/24      patient refused to attend community meeting and goal setting group at [phone] after encouragement from staff. 1:1 talk time provided as alternative to group session.           Signature:  Hart Fothergill, [address] [place] [address]

## 2021-02-17 NOTE — ED NOTES
Patient presents to ED with family after patient has not slept in several days. Patient has been reported to be \"hallucinating and acting strange\". Patient's family stated that ever since \"Bishop's Day, she has not slept, and she is preoccupied with everything that she reads on Social Media\". Patient unable to respond to questions due to manic/psychiatric state. Family reports patient \"naps\" but doesn't sleep. Patient is also reported to \"read and believe everything\". Patient did indicate she is \"just hearing noises\". Family reports patient had a psychiatric hospitalization in 2014. Family is very weary about any medications that will be administered to the patient. Patient is hesitant to take any medications because of an unhealthy level of fear towards any medications. Patient's family report she is \"not violent\" and she is \"all bark and no bite\". Patient eventually changed out into safety gown and paper pants after much hesitation and attempts to leave ED. Patient presents to be very confused and skeptical/paranoid of everything that is going on around her. Patient to be placed on a application for emergency admission by ED doctor stating \"Patient has not slept in 3 days, preoccupied/paranoid with Internet and social media. Previous hospitalization in 2014 with similar behavior. Auditory hallucinations. \"    This writer consulted with Dr Jeannette Harris who recommended inpatient hospitalization for safety and stabilization. Patient placed on application for involuntary admission to [facility].

## 2021-02-17 NOTE — ED NOTES
Mode of arrival (squad #, walk in, police, etc) : walk in         Chief complaint(s): mental health eval         Arrival Note (brief scenario, treatment PTA, etc). : Pt is brought in with her mom and dad this evening. Pt is alert at this time but unable to hold a conversation due to her manic behavior and disorganized thought process. Pt father states she has not been sleeping in days and she has been really worried about the events going on around her. Pt was seen at [facility] where her father signed her out AMA under the understanding she was her guardian. After clarifying with the father, he is NOT legal guardian, but POA. Pt is displaying paranoid behavior. She is able to be redirected. GCS 15        C= \"Have you ever felt that you should Cut down on your drinking? \"  No  A= \"Have people Annoyed you by criticizing your drinking? \"  No  G= \"Have you ever felt bad or Guilty about your drinking? \"  No  E= \"Have you ever had a drink as an Eye-opener first thing in the morning to steady your nerves or to help a hangover? \"  No      Deferred []      Reason for deferring: N/A    *If yes to two or more: probable alcohol abuse. Ted Upton RN  02/17/21 5173

## 2021-02-17 NOTE — ED NOTES
Pt father was brought back to the room to help to calm her down as she continued to repeat that she needed to speak with her father. Pt father explained to her that she needed to get help. It was decided by Dr. Martinez Martinez that pt should be placed on a pink slip after pt father states that he no longer wants her seen if there are no visitations in the [facility]. Pt was changed out into blue gown and belongings were checked with security.       Neil Duverney, RN  02/17/21 0040

## 2021-02-17 NOTE — BH NOTE
`[facility]  Admission Note     Admission Type:   Admission Type: Involuntary    Reason for admission:  Reason for Admission: no slept in three days, having audio hallucinations. pt focused on media sending messages    PATIENT STRENGTHS:  Strengths: Positive Support    Patient Strengths and Limitations:  Limitations: External locus of control    Addictive Behavior:   Addictive Behavior  In the past 3 months, have you felt or has someone told you that you have a problem with:  : None  Do you have a history of Chemical Use?: No  Do you have a history of Alcohol Use?: No  Do you have a history of Street Drug Abuse?: No  Histroy of Prescripton Drug Abuse?: No    Medical Problems:   Past Medical History:   Diagnosis Date    Body piercing     ears: helix and lobes    Uses contact lenses     Wears glasses        Status EXAM:  Status and Exam  Normal: No  Facial Expression: Avoids Gaze, Exaggerated  Affect: Incongruent  Mood:Normal: No  Mood: Anxious  Motor Activity:Normal: No  Motor Activity: Increased  Interview Behavior: Cooperative, Impulsive  Preception: [place] to Person  Attention:Normal: No  Attention: Distractible  Thought Processes: Flt.of Ideas, Loose Assoc. Thought Content:Normal: No  Thought Content: Paranoia, Preoccupations  Hallucinations:  Auditory (Comment)  Delusions: Yes  Delusions: Obsessions  Memory:Normal: No  Memory: Poor Recent, Poor Remote  Insight and Judgment: No  Insight and Judgment: Poor Insight, Poor Judgment  Present Suicidal Ideation: No  Present Homicidal Ideation: No    Tobacco Screening:  Practical Counseling, on admission, alma X, if applicable and completed (first 3 are required if patient doesn't refuse):            ( )  Recognizing danger situations (included triggers and roadblocks)                    ( )  Coping skills (new ways to manage stress, exercise, relaxation techniques, changing routine, distraction) ( )  Basic information about quitting (benefits of quitting, techniques in how to quit, available resources  ( ) Referral for counseling faxed to [facility]                                           ( ) Patient refused counseling  (X ) Patient has not smoked in the last 30 days    Metabolic Screening:    No results found for: LABA1C    No results for input(s): CHOL, TRIG, HDL, LDLCALC, LABVLDL in the last 72 hours. Body mass index is 31 kg/m². BP Readings from Last 2 Encounters:   02/17/21 (!) 151/91   02/16/21 116/83           Pt admitted with followings belongings:  Dentures: None  Vision - Corrective Lenses: None  Hearing Aid: None  Jewelry: None  Body Piercings Removed: No  Clothing: Socks, Shirt, Other (Comment)(bra)  Were All Patient Medications Collected?: No  Other Valuables: Money (Comment), Wallet(147.00 dollars, visa, 2 insurance cards, social security card)     Valubles placed in safe in security envelope, number: [id number]. Patient's home medications were reviewed. Patient oriented to surroundings and program expectations and copy of patient rights given. Received admission packet:  yes. Consents reviewed, signed  Refused . Patient verbalize understanding:  JOSE.     Patient education on precautions: YES                   Murray Estrada RN

## 2021-02-17 NOTE — CARE COORDINATION
BHI Biopsychosocial Assessment    Current Level of Psychosocial Functioning     Independent   Dependent  X   Minimal Assist       Psychosocial High Risk Factors (check all that apply)    Unable to obtain meds   Chronic illness/pain     Substance abuse X Marijuana   Lack of Family Support   Financial stress X  Isolation X  Inadequate Community Resources  X   Suicide attempt(s)  Not taking medications  X   Victim of crime   Developmental Delay   Unable to manage personal needs X   Age 72 or older   Homeless   No transportation   Readmission within 30 days  Unemployment  Traumatic Event    Psychiatric Advanced Directives: none reported     Family to Involve in Treatment: Pt mother and father are suportive and involved in her care     Sexual Orientation:  JOSE    Patient Strengths: family support, insurance, adequate housing     Patient Barriers: not linked with CMHC, off medications, presenting on admission with an increase in paranoia, and experiencing auditory hallucinations, no income     Opiate Education Provided: N/A Pt denies and does not have a documented history of Opiate or Heroin use/abuse. CMHC/mental health history: Pt is not currently linked with a CMHC and is not taking any Psychiatric medications. Pt's last hospitalization at [facility]iatric unit was in April 2014. Plan of Care   medication management, group/individual therapies, family meetings, psycho -education, treatment team meetings to assist with stabilization, referral to community resources. Initial Discharge Plan:  Pt reports a plan to [facility] to stay with her family in [place] at discharge. She will also follow up with Outpatient mental health  treatment. Clinical Summary:   Guera Schneider is a 31 y.o. female with significant past medical history of acute psychosis who presented to the ED [facility] with complaints of auditory and visual hallucinations. Patient provides very scattered information,. Patient presents to ED with family after patient has not slept in several days.    Patient has difficulty  responding to assessment questions due to manic/psychiatric state.   Family reports that Pt's last  psychiatric hospitalization was in 2014. Patient states that she had been seeing people at her house that are not actually there and stating that they have been intermittently talking to her. She seems quite paranoid upon assessment and stated that things keep being done to her and her family. Pt denies suicidal and homicidal ideations during assessment. It was documented in Pt chart that Pt's father brought her to [facility] emergency department with the complaint of psychosis. Pt father  stated that Pt  has not been able to sleep over the past 3 days and that she has become preoccupied and paranoid with the Internet and afraid of what was going on in the world today. Pt is not currently linked with a CMHC and is not taking any Psychiatric medications. Pt's last hospitalization at [facility] inpatient Psychiatric unit was in April 2014.

## 2021-02-17 NOTE — ED NOTES
Writer called out to waiting room to speak with pt family. They are concerned that there are no visitors allowed in the [facility]. All questions were answered.        Luba Lutz RN  02/17/21 6138

## 2021-02-17 NOTE — ED NOTES
Patient attempted to get up out of bed several times while in ED/JACLYN. This writer had to redirect patient several times to stay in bed/room. Patient continuously attempted to [facility]\". Patient insisted that she was in a movie and \"bad things were happening\". When patient was to be picked up by [facility] staff, upon introduction patient identified her name was [employer]" and not her given name Sonu Zamudio.

## 2021-02-17 NOTE — PLAN OF CARE
[facility]  Initial Interdisciplinary Treatment Plan NO      Original treatment plan Date & Time:2/17/21    Admission Type:  Admission Type: Involuntary    Reason for admission:   Reason for Admission: no slept in three days, having audio hallucinations. pt focused on media sending messages    Estimated Length of Stay:  5-7days  Estimated Discharge Date: to be determined by physician    PATIENT STRENGTHS:  Patient Strengths:Strengths: Positive Support  Patient Strengths and Limitations:Limitations: External locus of control  Addictive Behavior: Addictive Behavior  In the past 3 months, have you felt or has someone told you that you have a problem with:  : None  Do you have a history of Chemical Use?: No  Do you have a history of Alcohol Use?: No  Do you have a history of Street Drug Abuse?: No  Histroy of Prescripton Drug Abuse?: No  Medical Problems:  Past Medical History:   Diagnosis Date    Body piercing     ears: helix and lobes    Uses contact lenses     Wears glasses      Status EXAM:Status and Exam  Normal: No  Facial Expression: Avoids Gaze, Exaggerated  Affect: Incongruent  Mood:Normal: No  Mood: Anxious  Motor Activity:Normal: No  Motor Activity: Increased  Interview Behavior: Cooperative, Impulsive  Preception: [place] to Person  Attention:Normal: No  Attention: Distractible  Thought Processes: Flt.of Ideas, Loose Assoc. Thought Content:Normal: No  Thought Content: Paranoia, Preoccupations  Hallucinations:  Auditory (Comment)  Delusions: Yes  Delusions: Obsessions  Memory:Normal: No  Memory: Poor Recent, Poor Remote  Insight and Judgment: No  Insight and Judgment: Poor Insight, Poor Judgment  Present Suicidal Ideation: No  Present Homicidal Ideation: No    EDUCATION:   Learner Progress Toward Treatment Goals: reviewed group plans and strategies for care    Method:group therapy, medication compliance, individualized assessments and care planning    Outcome: needs reinforcement    PATIENT GOALS: to be discussed with patient within 72 hours    PLAN/TREATMENT RECOMMENDATIONS:     continue group therapy , medications compliance, goal setting, individualized assessments and care, continue to monitor pt on unit      SHORT-TERM GOALS:   Time frame for Short-Term Goals: 5-7 days    LONG-TERM GOALS:  Time frame for Long-Term Goals: 6 months  Members Present in Team Meeting: See Signature Sheet    Hart Fothergill, [place]

## 2021-02-17 NOTE — PLAN OF CARE
Problem: Altered Mood, Psychotic Behavior:  Goal: Able to verbalize decrease in frequency and intensity of hallucinations  Description: Able to verbalize decrease in frequency and intensity of hallucinations  Outcome: Ongoing     Problem: Altered Mood, Psychotic Behavior:  Goal: Able to verbalize reality based thinking  Description: Able to verbalize reality based thinking  Outcome: Ongoing     Patient unable to verbalize reality based thinking. Patient denies hallucinations but engages in delusions that she is being watched in the shower and remains paranoid about people stealing from her. Patient attempted to undress in dayarea and was able to verbally redirect with writer. Patient refused vital signs and gave minimal information to writer during assessment. Will continue to monitor and offer support as needed.

## 2021-02-17 NOTE — BH NOTE
RT ASSESSMENT TREATMENT GOALS    []Pt Goal: Pt will identify 1-2 positive coping skills by time of discharge. []Pt Goal: Pt will identify 1-2 positive aspects of self by time of discharge. [x]Pt Goal: Pt will remain on task/topic for 15-30 minutes during group by time of discharge. []Pt Goal: Pt will identify 1-2 aspects of relapse prevention plan by time of discharge. [x]Pt Goal: Pt will join in reality conversation with peers 1-2 times per group by time of discharge. []Pt Goal: Pt will identify 1-2 new leisure interests by time of discharge. []Pt Goal: Pt will not voice any delusional content by time of discharge.

## 2021-02-17 NOTE — CONSULTS
[facility]  / FOLLOW UP VISIT       Date:   2021  Patient name:  Pamela Keith  Date of admission:  2021  1:32 AM  MRN:   [id number]  Account:  [de-identified]  YOB: 1990  PCP:    Aureliano Flores DO  Room:   [address] [place] [address]  Code Status:    Full Code    Physician Requesting Consult: Julissa Cruz MD    History of Present Illness:      C/C ;     REASON FOR CONSULT;   hypokalemia   Medical comorbidity and medication management ; Active Problems:    Acute psychosis ([address]  Resolved Problems:    * No resolved hospital problems. *            Subjective:     Chief Complaint   Patient presents with    Back Pain    Psychiatric Evaluation         Active Problems:    Acute psychosis ([address]  Resolved Problems:    * No resolved hospital problems. *       HPI     k 3.2   Not on diuretic      Significant last 24 hr data reviewed [] yes        Significant last 24 hr data reviewed ;   Vitals:    21 0143 21 0455   BP: 135/86 (!) 151/91   Pulse: 90 108   Resp: 18 16   Temp: 99.7 °F (37.6 °C) 98.7 °F (37.1 °C)   TempSrc: Oral Oral   SpO2: 100%    Weight: 235 lb (106.6 kg) 235 lb (106.6 kg)   Height: 6' 1\" (1.854 m) 6' 1\" (1.854 m)     No results for input(s): POCGLU in the last 72 hours. Recent Results (from the past 24 hour(s))   COVID-19, Rapid    Collection Time: 21  2:15 AM    Specimen: Nasopharyngeal Swab   Result Value Ref Range    Specimen Description . NASOPHARYNGEAL SWAB     SARS-CoV-2, Rapid Not Detected Not Detected     No results for input(s): POCGLU in the last 72 hours. No results found.          --  Hx on Admission;        Past Medical History:   Diagnosis Date    Body piercing     ears: helix and lobes    Uses contact lenses     Wears glasses        Family History   Problem Relation Age of Onset    Breast Cancer Other     Lung Cancer Other  Heart Disease Other        Social History:     Tobacco:    reports that she has never smoked. She has never used smokeless tobacco.  Alcohol:      reports no history of alcohol use. Neurological ;                 No focal motor deficit ,                 No focal sensory deficit ,  Drug Use: reports previous drug use. Drug: Marijuana. Review of Systems:     POSITIVE AND NEGATIVES AS DESCRIBED IN HISTORY OF PRESENT ILLNESS ;  IN ADDITION ;   Review of Systems          All other systems negative              Physical Exam:     Vitals:  BP (!) 151/91   Pulse 108   Temp 98.7 °F (37.1 °C) (Oral)   Resp 16   Ht 6' 1\" (1.854 m)   Wt 235 lb (106.6 kg)   LMP 02/01/2021 (Approximate)   SpO2 100%   BMI 31.00 kg/m²                 Body mass index is 31 kg/m². General Appearance:   Alert , CO-OPERATIVE ,     H E:E N T      No icterus, no pallor . No ptosis. No gross asymmetry  Or abnormality face            Skin:                             No rash or erythema  Neck:                            No mass , no thyroid enlargement                                           Pulmonary/Chest:        Clear to auscultation bilaterally . No wheezes, rales or rhonchi . Cardiovascular:            Normal rate, regular rhythm,                                          No murmur orGallop . Abdomen:                       Soft, non-tender                                           Normalbowels sounds,                                             Extremities:                    No  Edema .              Data:     Labs:    URINE ANALYSIS: No results found for: LABURIN     CBC:  Lab Results   Component Value Date    WBC 10.1 02/16/2021    HGB 12.4 02/16/2021     02/16/2021        BMP:    Lab Results   Component Value Date     02/16/2021    K 3.2 02/16/2021     02/16/2021    CO2 22 02/16/2021    BUN 10 02/16/2021    CREATININE 0.75 02/16/2021 GLUCOSE 105 02/16/2021      LIVER PROFILE:  Lab Results   Component Value Date    ALT 9 02/16/2021    AST 16 02/16/2021    PROT 8.2 02/16/2021    BILITOT 0.53 02/16/2021    LABALBU 4.3 02/16/2021              Radiology:         Medications: Allergies: Allergies   Allergen Reactions    Latex Hives and Other (See Comments)    Honey Hives     Pt.states she broke out in hives when raw honey was applied.  Bee Venom Swelling    Contrast [Iodides]      Abstracted from [facility]- patient could not confirm this allergy with me on 2-17-21- states she is not sure. SP 2-17-21. Current Meds:   Scheduled Meds:    LORazepam  2 mg Intramuscular Once    risperiDONE  1 mg Oral BID     Continuous Infusions:   PRN Meds: acetaminophen, aluminum & magnesium hydroxide-simethicone, haloperidol lactate **AND** diphenhydrAMINE, haloperidol **AND** LORazepam, hydrOXYzine, traZODone, polyethylene glycol, ibuprofen    Assessment :      Primary Problem  Active Problems:    Acute psychosis ([facility]  Resolved Problems:    * No resolved hospital problems. *       No results for input(s): POCGLU in the last 72 hours. Vitals:    02/17/21 0143 02/17/21 0455   BP: 135/86 (!) 151/91   Pulse: 90 108   Resp: 18 16   Temp: 99.7 °F (37.6 °C) 98.7 °F (37.1 °C)   TempSrc: Oral Oral   SpO2: 100%    Weight: 235 lb (106.6 kg) 235 lb (106.6 kg)   Height: 6' 1\" (1.854 m) 6' 1\" (1.854 m)         Plan:      has hypokalemia k 3.2     2/17/21    · Will replace  1. Monitor                      Thanks for consulting us . Will monitor vitals and clinical course , and  Optimize therapy  as needed . Lalitha Hadley MD      Copy sentto Dr. Prudencio Calderón, DO    Please note that this chart was generated using voice recognition Dragon dictation software. Although every effort was made to ensure the accuracy of this automated transcription, some errors in transcription may have occurred.

## 2021-02-17 NOTE — ED PROVIDER NOTES
contact lenses     Wears glasses      SURGICAL HISTORY       Past Surgical History:   Procedure Laterality Date    FRENULECTOMY      tongue procedure    HAND SURGERY Left 2010    removal of cyst of dorsum of hand     CURRENT MEDICATIONS       Previous Medications    CEPHALEXIN (KEFLEX) 250 MG CAPSULE    Take one PO QID. Start day before surgery. LURASIDONE (LATUDA) 40 MG TABS TABLET    Take 1 tablet by mouth daily. MISC NATURAL PRODUCTS (HERBAL ENERGY COMPLEX PO)    Take 1 capsule by mouth daily AHCC (active hexose correlated compound, from mushroom) capsules     ALLERGIES     is allergic to latex; honey; and bee venom. FAMILY HISTORY          SOCIAL HISTORY       Social History     Tobacco Use    Smoking status: Never Smoker    Smokeless tobacco: Never Used   Substance Use Topics    Alcohol use: No    Drug use: Not Currently     Types: Marijuana     Comment: occasional marijuana smoking or eating     PHYSICAL EXAM     INITIAL VITALS: /86   Pulse 90   Temp 99.7 °F (37.6 °C) (Oral)   Resp 18   Ht 6' 1\" (1.854 m)   Wt 235 lb (106.6 kg)   LMP 02/01/2021 (Approximate)   SpO2 100%   BMI 31.00 kg/m²    Physical Exam  Vitals signs and nursing note reviewed. Constitutional:       General: She is not in acute distress. Appearance: She is well-developed. HENT:      Head: Normocephalic and atraumatic. Eyes:      Conjunctiva/sclera: Conjunctivae normal.   Neck:      Musculoskeletal: Neck supple. Cardiovascular:      Rate and Rhythm: Normal rate and regular rhythm. Heart sounds: No murmur. No friction rub. Pulmonary:      Effort: Pulmonary effort is normal. No respiratory distress. Breath sounds: Normal breath sounds. No wheezing or rhonchi. Abdominal:      General: There is no distension. Palpations: Abdomen is soft. Tenderness: There is no abdominal tenderness. There is no guarding or rebound.    Musculoskeletal:      Comments: No midline spinal tenderness to palpation no paraspinal tenderness to palpation   Skin:     General: Skin is warm and dry. Capillary Refill: Capillary refill takes less than 2 seconds. Neurological:      Mental Status: She is alert. Psychiatric:      Comments: Psychomotor agitation       DIAGNOSTIC RESULTS   RADIOLOGY:All plain film, CT, MRI, and formal ultrasound images (except ED bedside ultrasound) are read by the radiologist, see reports below, unless otherwisenoted in MDM or here. No orders to display     LABS: All lab results were reviewed by myself, and all abnormals are listed below. Labs Reviewed   COVID-19, RAPID   COVID-19, RAPID       EMERGENCY DEPARTMENTCOURSE:     Differential diagnosis includes exacerbation of chronic mental illness acute stress reaction polysubstance abuse. Patient had lab work performed yesterday she had mild hypokalemia with a potassium of 3.2 but no other electrolyte abnormalities no elevation in creatinine no LFT elevation pregnancy negative Tylenol salicylate ethanol are negative. The patient repeatedly attempting to leave the emergency department she was given Ativan she does have psychomotor agitation she is medically cleared at this time will admit for further management of her mental health disorder. .    I did spend time speaking to the patient as well as the patient's family members about her case and her necessity for admission. Initially, the patient's father said that he was the patient's guardian however he says that he never went to court and received guardianship he is the patient's power of  not the patient's guardian.   He expressed dissatisfaction with the fact that she will not be able to have a phone with FaceTime abilities secondary to the fact that this is a HIPAA violation and that they cannot come and physically visit the patient however they called around and other psychiatric hospitals in the area also have similar policies and so the father is okay with the patient being admitted at [facility] I do believe that the patient requires admission we apologized for these unfortunate policies. CRITICAL CARE: There was a high probability of clinically significant/life threatening deterioration in this patient's condition which required my urgent intervention. Total critical care time was 30 minutes. This excludes any time for separately reportable procedures. Vitals:    Vitals:    02/17/21 0143   BP: 135/86   Pulse: 90   Resp: 18   Temp: 99.7 °F (37.6 °C)   TempSrc: Oral   SpO2: 100%   Weight: 235 lb (106.6 kg)   Height: 6' 1\" (1.854 m)       The patient was given the following medications while in the emergency department:  Orders Placed This Encounter   Medications    LORazepam (ATIVAN) tablet 2 mg    haloperidol lactate (HALDOL) injection 5 mg    LORazepam (ATIVAN) injection 2 mg         FINAL IMPRESSION      1.  Psychosis, unspecified psychosis type [facility])         DISPOSITION/PLAN   DISPOSITION Decision To Admit 02/17/2021 02:18:41 AM  Terry Negro MD  Attending Emergency Physician    This charting supersedes any ED resident or staff charting and was written using speech recognition software        Terry Negro MD  02/17/21 [id number]       Terry Negro MD  02/20/21 9678

## 2021-02-17 NOTE — GROUP NOTE
Group Therapy Note    Date: 2/17/2021    Group Start Time: 1100  Group End Time: 2488  Group Topic: Recreational    [address], [place]        Group Therapy Note    Attendees: 14/24        patient refused to attend recreation and leisure group at [phone] after encouragement from staff. 1:1 talk time provided as alternative to group session.         Signature:  Zoraida Polanco [place]

## 2021-02-17 NOTE — H&P
Department of Psychiatry  Attending Physician Psychiatric Assessment     Reason for Admission to Psychiatric Unit:  Threat to self requiring 24 hour professional observation  Command hallucinations directing harm to self or others; risk of the patient taking action  Acute disordered/bizarre behavior or psychomotor agitation or retardation;interferes with ADLs so that patient cannot function at a less intensive care level of care during evaluation and treatment   Concerns about patient's safety in the community    CHIEF COMPLAINT: Acute psychosis    History obtained from:  patient, electronic medical record and family members    HISTORY OF PRESENT ILLNESS:    Steve Lockett is a 31 y.o. female with significant past medical history of acute psychosis who presented to the ED [facility] with complaints of auditory and visual hallucinations. Patient provides very scattered information, so much information is obtained from the emergency department note. Patient states that she had been seeing people at her house that are not actually there and stating that they have been intermittently talking to her. Patient stated that the snow is making noise to her and speaking to her. She also stated that the noise through her window is telling her things. She stated that the TV is loud and tells her to do things and she can hear it from any TV in any room. She seems quite paranoid upon assessment and stated that things keep being done to her and her family and things are wrong that they have spoken to their landlord about but nothing is getting done. She was denying suicidal or homicidal ideations in the emergency department. The patient did initially leave [facility] despite wanting admission to [facility], but transportation was delayed due to weather conditions. Her father brought her to [facility] emergency department several hours later with the complaint of psychosis.   He stated that she had not been able to sleep over the past 3 days and that she has become preoccupied and paranoid with the Internet and afraid of what was going on in the world today. To be given Ativan in the emergency department as she attempted to leave multiple times psychomotor agitation noted. Upon assessment, she answer some questions, and becomes very defensive to others. She refuses to answer some questions. She is still very agitated and behavior is bizarre. She grabbed this writer's identification, and wrote down all of the [place] emergency code colors on her paper. She also repeatedly wrote \"Minerva\" and \"Dr. Meredith Schilder" in various places on her paper as her providers. She became tearful at times during assessment. Patient had to be given Ativan and Haldol due to agitation early this afternoon. She did consent for certain staff to talk to her father, but did not want the nurse practitioner to talk to her father.       PSYCHIATRIC HISTORY:  yes -psychosis  Currently follows with n/a  Denies-lifetime suicide attempts  Unknown psychiatric hospital admissions    Past psychiatric medications includes: Uknown    Adverse reactions from psychotropic medications: no    Lifetime Psychiatric Review of Systems         Lissette or Hypomania: denies      Panic Attacks: denies      Phobias: denies     Obsessions and Compulsions:denies     Body or Vocal Tics:  denies     Hallucinations:auditory and visual     Delusions: probable paranoid paranoid/grandiose/erotomania/persecutory/bizarre/non bizarre/mood congruent/ mood incongruent    Past Medical History:        Diagnosis Date    Acute psychosis ([facility] 02/17/2021    Body piercing     ears: helix and lobes    Hypokalemia 02/17/2021    Kidney stones     Sickle cell trait ([address]     Wears glasses        Past Surgical History:        Procedure Laterality Date    FRENULECTOMY      tongue procedure    HAND SURGERY Left 2010    removal of cyst of dorsum of hand    LIPOSUCTION  12/04/2020    ABD distress  HENT:   Head: Normocephalic and atraumatic. Eyes: Conjunctivae are normal. Right eye exhibits no discharge. Left eye exhibits no discharge. No scleral icterus. Neck: Normal range of motion. Neck supple. Pulmonary/Chest:  No respiratory distress or accessory muscle use, no wheezing. Abdominal: Soft. Exhibits no distension. Musculoskeletal: Normal range of motion. Exhibits no edema. Neurological: cranial nerves II-XII grossly in tact, normal gait and station  Skin: Skin is warm and dry. Patient is not diaphoretic. No erythema.          Mental Status Examination:    Level of consciousness:  within normal limits   Appearance:  Hospital attire, seated on the side of bed, fair grooming   Behavior/Motor:  Psychomotor agitation  Attitude toward examiner:   Uncooperative, irritable, no eye contact  Speech: Rapid  Mood:  Irritable  Affect:  Mood congruent  Thought processes:  loose associations  Thought content: denies suicidal ideations without current plan or intent               denies homicidal ideations               Auditory hallucinations              Paranoid delusions  Cognition:  Unable to assess  Concentration: poor  Memory: unable to determine  Insight &Judgment: poor    DSM-5 Diagnosis  Acute psychosis    Psychosocial and Contextual factors:  Financial  Occupational  Relationship  Legal   Living situation  Educational     Past Medical History:   Diagnosis Date    Acute psychosis ([facility] 02/17/2021    Body piercing     ears: helix and lobes    Hypokalemia 02/17/2021    Kidney stones     Sickle cell trait ([facility]     Wears glasses         TREATMENT PLAN    Risk Management:  close watch per standard protocol      Psychotherapy:  participation in milieu and group and individual sessions with Attending Physician,  and Physician Assistant/CNP      Estimated length of stay:  2-14 days      GENERAL PATIENT/FAMILY EDUCATION  Begin Risperdal 1 mg twice daily  Patient will understand basic signs and symptoms, Patient will understand benefits/risks and potential side effects from proposed meds and Patient will understand their role in recovery. Family is  active in patient's care. Patient assets that may be helpful during treatment include: Intent to participate and engage in treatment, sufficient fund of knowledge and intellect to understand and utilize treatments. Goals:    Remission of psychosis  Establish efficacy of medications  Participation in group and milieu activities        Behavioral Services  Medicare Certification     Admission Day 1  I certify that this patient's inpatient psychiatric hospital admission is medically necessary for:    x (1) treatment which could reasonably be expected to improve this patient's condition, or    x (2) diagnostic study or its equivalent. Time Spent: 60 minutes     Physicians Signature:  Electronically signed by ELIGIO Dykes CNP on 2/17/21 at 4:14 PM EST  I independently saw and evaluated the patient. I reviewed the midlevel provider's documentation above. Any additional comments or changes to the midlevel provider's documentation are stated below otherwise agree with assessment. The patient was seen at bedside along with the medical student. The patient was severely thought disordered. It was hard to make sense of her conversation. She appears to have slight pressured speech and flight of ideas. The patient appears to be internally stimulated. Noted that the patient has received antipsychotic as needed medications this morning. The patient was able to comprehend the necessity of treatment with medication and was agreeable to a trial of risperidone 1 mg twice daily which has been ordered for the patient. PLAN  Medications as noted above  Attempt to develop insight  Psycho-education conducted. Supportive Therapy conducted.   Probable discharge is as noted above  Follow-up daily while on inpatient unit    Electronically signed by Calixto Lyles MD on 2/17/21 at 5:00 PM EST

## 2021-02-17 NOTE — PROGRESS NOTES
Pharmacy Medication History Note      List of current medications patient is taking is complete. Source of information: [employer] [place], [place]    Changes made to medication list:  Medications removed (include reason, ex. therapy complete or physician discontinued, noncompliance):  Cephalexin (therapy completed), Lurasidone (list clean up), Herbal product (list clean up)    Medications added/doses adjusted:  none    Other notes (ex. Recent course of antibiotics, Coumadin dosing): The patient has not filled any medications since October 2020 and at that time was only short supplies. Patient has not filled any chronic medications with [employer] in the last year. Reviewed office visit notes from patient's PCP Aurelia Wang DO) and noted no maintenance medications listed as well. Please let me know if you have any questions about this encounter. Thank you!     Electronically signed by Shekhar Alejandre, [facility] on 2/17/2021 at 8:59 AM

## 2021-02-18 ENCOUNTER — APPOINTMENT (OUTPATIENT)
Dept: CT IMAGING | Age: 31
DRG: 751 | End: 2021-02-18
Payer: MEDICARE

## 2021-02-18 PROCEDURE — 70450 CT HEAD/BRAIN W/O DYE: CPT

## 2021-02-18 PROCEDURE — 6370000000 HC RX 637 (ALT 250 FOR IP): Performed by: PSYCHIATRY & NEUROLOGY

## 2021-02-18 PROCEDURE — 99231 SBSQ HOSP IP/OBS SF/LOW 25: CPT | Performed by: INTERNAL MEDICINE

## 2021-02-18 PROCEDURE — 6370000000 HC RX 637 (ALT 250 FOR IP): Performed by: NURSE PRACTITIONER

## 2021-02-18 PROCEDURE — 6370000000 HC RX 637 (ALT 250 FOR IP): Performed by: INTERNAL MEDICINE

## 2021-02-18 PROCEDURE — 99232 SBSQ HOSP IP/OBS MODERATE 35: CPT | Performed by: PSYCHIATRY & NEUROLOGY

## 2021-02-18 PROCEDURE — 1240000000 HC EMOTIONAL WELLNESS R&B

## 2021-02-18 PROCEDURE — 6360000002 HC RX W HCPCS: Performed by: PSYCHIATRY & NEUROLOGY

## 2021-02-18 RX ORDER — LORAZEPAM 1 MG/1
1 TABLET ORAL
Status: ACTIVE | OUTPATIENT
Start: 2021-02-18 | End: 2021-02-18

## 2021-02-18 RX ORDER — RISPERIDONE 2 MG/1
2 TABLET, FILM COATED ORAL 2 TIMES DAILY
Status: DISCONTINUED | OUTPATIENT
Start: 2021-02-18 | End: 2021-02-19 | Stop reason: HOSPADM

## 2021-02-18 RX ADMIN — RISPERIDONE 1 MG: 1 TABLET, FILM COATED ORAL at 08:37

## 2021-02-18 RX ADMIN — POTASSIUM CHLORIDE 20 MEQ: 1500 TABLET, EXTENDED RELEASE ORAL at 08:37

## 2021-02-18 RX ADMIN — HALOPERIDOL LACTATE 5 MG: 5 INJECTION, SOLUTION INTRAMUSCULAR at 09:32

## 2021-02-18 RX ADMIN — RISPERIDONE 2 MG: 2 TABLET ORAL at 21:06

## 2021-02-18 RX ADMIN — HYDROXYZINE HYDROCHLORIDE 50 MG: 25 TABLET, FILM COATED ORAL at 14:47

## 2021-02-18 RX ADMIN — DIPHENHYDRAMINE HYDROCHLORIDE 25 MG: 50 INJECTION, SOLUTION INTRAMUSCULAR; INTRAVENOUS at 09:32

## 2021-02-18 RX ADMIN — TRAZODONE HYDROCHLORIDE 50 MG: 50 TABLET ORAL at 21:06

## 2021-02-18 NOTE — BH NOTE
Pt is agitated as evidence by  Screaming obscenities at staff, getting in another patients face, screaming at other patients during group. Staff attempted to find and relieve the distress by  talking to pt, refocusing on new activity,  administering PRN medications. Pt is currently in behavioral control, accepted PRN medications.

## 2021-02-18 NOTE — GROUP NOTE
Group Therapy Note    Date: 2/18/2021    Group Start Time: 1030  Group End Time: 6475  Group Topic: Psychoeducation    KEV Pollard, CTRS    Patient refused to attend communication skills group at 21 [phone] after encouragement from staff. 1:1 talk time offered by staff as alternative to group session.

## 2021-02-18 NOTE — PLAN OF CARE
[address] [place] [address]  Day 3 Interdisciplinary Treatment Plan NOTE    Review Date & Time 2/18/21    Admission Type:   Admission Type: Involuntary    Reason for admission:  Reason for Admission: no slept in three days, having audio hallucinations. pt focused on media sending messages  Estimated Length of Stay: 5-7 days  Estimated Discharge Date Update: to be determined by physician    PATIENT STRENGTHS:  Patient Strengths Strengths: Positive Support  Patient Strengths and Limitations:Limitations: Multiple barriers to leisure interests, Unrealistic self-view, External locus of control, Difficulty problem solving/relies on others to help solve problems, Difficult relationships / poor social skills  Addictive Behavior:Addictive Behavior  In the past 3 months, have you felt or has someone told you that you have a problem with:  : None  Do you have a history of Chemical Use?: No  Do you have a history of Alcohol Use?: No  Do you have a history of Street Drug Abuse?: No  Histroy of Prescripton Drug Abuse?: No  Medical Problems:  Past Medical History:   Diagnosis Date    Acute psychosis ([facility] 02/17/2021    Body piercing     ears: helix and lobes    Hypokalemia 02/17/2021    Kidney stones     Sickle cell trait ([facility]     Wears glasses        Risk:  Fall RiskTotal: 77  Rick Scale Rick Scale Score: 22  BVC Total: 1  Change in scores no Changes to plan of Care no    Status EXAM:   Status and Exam  Normal: No  Facial Expression: Flat, Exaggerated  Affect: Blunt, Inappropriate, Incongruent, Unstable  Level of Consciousness: Alert  Mood:Normal: No  Mood: Anxious, Suspicious  Motor Activity:Normal: Yes  Motor Activity: Increased  Interview Behavior: Impulsive, Uncooperative/Withdrawn  Preception: [place] to Person, [place] to Time, [place] to Place, [place] to Situation  Attention:Normal: No  Attention: Distractible, Unable to Concentrate  Thought Processes: Flt.of Ideas, Loose Assoc.   Thought Content:Normal: No  Thought Content: Preoccupations, Delusions, Paranoia  Hallucinations: Auditory (Comment)  Delusions: Yes  Delusions: Other(See Comment)(paranoid)  Memory:Normal: No  Memory: Confabulation, Poor Recent, Poor Remote  Insight and Judgment: No  Insight and Judgment: Poor Judgment, Poor Insight, Unrealistic  Present Suicidal Ideation: No  Present Homicidal Ideation: No    Daily Assessment Last Entry:   Daily Sleep (WDL): Exceptions to WDL         Patient Currently in Pain: No  Daily Nutrition (WDL): Within Defined Limits    Patient Monitoring:  Frequency of Checks: 4 times per hour, close    Psychiatric Symptoms:   Depression Symptoms  Depression Symptoms: No problems reported or observed.   Anxiety Symptoms  Anxiety Symptoms: Generalized  Lissette Symptoms  Lissette Symptoms: Flight of ideas, Poor judgment     Psychosis Symptoms  Delusion Type: Controlling, Paranoid    Suicide Risk CSSR-S:  1) Within the past month, have you wished you were dead or wished you could go to sleep and not wake up? : No  2) Have you actually had any thoughts of killing yourself? : No  6) Have you ever done anything, started to do anything, or prepared to do anything to end your life?: No  Change in Result na  Change in Plan of care na       EDUCATION:   EDUCATION:   Learner Progress Toward Treatment Goals: Reviewed results and recommendations of this team, Reviewed group plan and strategies, Reviewed signs, symptoms and risk of self harm and violent behavior, Reviewed goals and plan of care    Method:small group, individual verbal education    Outcome:verbalized by patient, but needs reinforcement to obtain goals    PATIENT GOALS:  Short term: Patient is encouraged to set goals   Long term: patient is encouraged to set goals    PLAN/TREATMENT RECOMMENDATIONS UPDATE: continue with group therapies, increased socialization, continue planning for after discharge goals, continue with medication compliance    SHORT-TERM GOALS UPDATE:   Time frame for Short-Term Goals: 5-7 days    LONG-TERM GOALS UPDATE:   Time frame for Long-Term Goals: 6 months  Members Present in Team Meeting: See Signature [address] Blane Muller

## 2021-02-18 NOTE — GROUP NOTE
Group Therapy Note    Date: 2/18/2021    Group Start Time: 1330  Group End Time: 3314  Group Topic: Psychoeducation    BEREKET LarkinS    Group Therapy Note    Attendees: 9    Patient's Goal:  Pt will demonstrate improved interpersonal skills    Notes:  Pt attended group and participated    Status After Intervention:  Improved    Participation Level:  Active Listener and Interactive    Participation Quality: Appropriate      Speech:  normal      Thought Process/Content: Flight of ideas      Affective Functioning: Incongruent      Mood: depressed      Level of consciousness:  Alert      Response to Learning: Able to verbalize current knowledge/experience      Endings: None Reported    Modes of Intervention: Education, Support, Socialization and Exploration      Discipline Responsible: Psychoeducational Specialist      Signature:  Satinder Pak [place]

## 2021-02-18 NOTE — PLAN OF CARE
Problem: Altered Mood, Psychotic Behavior:  Goal: Able to verbalize decrease in frequency and intensity of hallucinations  Description: Able to verbalize decrease in frequency and intensity of hallucinations  Outcome: Ongoing     Problem: Altered Mood, Psychotic Behavior:  Goal: Able to verbalize reality based thinking  Description: Able to verbalize reality based thinking  Outcome: Ongoing     Patient denies suicidal or homicidal ideations. Patient denies hallucinations. Patient remains very delusional and paranoid. She exhibits bizarre behaviors, often times coming out to nurse station with blankets over her entire body. She acts as though she is completely blind at times. She states she has sickel cell trait and she needs her \"blood checked. \" She is very intrusive and has poor boundaries. She attempts to grab and touch staff and other patients. She does not display any reality based thought process. She attends groups but often gets off-topic and becomes disruptive. She requires PRN medications for anxiety and agitation- see MAR. Patient has remained safe. Line of sight order for patient safety.

## 2021-02-18 NOTE — BH NOTE
PRN Note:    Patient received PRN medications for anxiety and agitation (see MAR). Patient walking towards staff member in attempts to grab medication out of staff members pocket. Patient also attempting to walk towards unit exit doors. Patient refused to go to room, continued to argue with staff and attempting to take medications from staff member. Patient received two IM injections without incident. Safety was maintained throughout process. Will continue to monitor.

## 2021-02-18 NOTE — CONSULTS
[address]  / FOLLOW UP VISIT       Date:   2/18/2021  Patient name:  Nickolas Juarez  Date of admission:  2/17/2021  1:32 AM  MRN:   [id number]  Account:  [de-identified]  YOB: 1990  PCP:    Darrel Herrera DO  Room:   [facility]  Code Status:    Full Code    Physician Requesting Consult: Nitza Rios MD    History of Present Illness:      C/C ;     REASON FOR CONSULT;   hypokalemia   Medical comorbidity and medication management ;  Principal Problem:    Acute psychosis ([facility]  Active Problems:    Hypokalemia  Resolved Problems:    * No resolved hospital problems. *            Subjective:     Chief Complaint   Patient presents with    Back Pain    Psychiatric Evaluation         Principal Problem:    Acute psychosis ([facility]  Active Problems:    Hypokalemia  Resolved Problems:    * No resolved hospital problems. *       HPI     k 3.2   Not on diuretic     2/18/21   on k replacement will check bmp in am     Significant last 24 hr data reviewed [x] yes        Significant last 24 hr data reviewed ;   Vitals:    02/17/21 0143 02/17/21 0455 02/17/21 2022 02/18/21 0745   BP: 135/86 (!) 151/91 (!) 142/92 101/67   Pulse: 90 108 109 108   Resp: 18 16 14 14   Temp: 99.7 °F (37.6 °C) 98.7 °F (37.1 °C) 98.3 °F (36.8 °C) 98 °F (36.7 °C)   TempSrc: Oral Oral Oral Oral   SpO2: 100%   98%   Weight: 235 lb (106.6 kg) 235 lb (106.6 kg)     Height: 6' 1\" (1.854 m) 6' 1\" (1.854 m)       No results for input(s): POCGLU in the last 72 hours. No results found for this or any previous visit (from the past 24 hour(s)). No results for input(s): POCGLU in the last 72 hours. No results found.          --  Hx on Admission;        Past Medical History:   Diagnosis Date    Acute psychosis ([facility] 02/17/2021    Body piercing     ears: helix and lobes    Hypokalemia 02/17/2021    Kidney stones     .  Will monitor vitals and clinical course , and  Optimize therapy  as needed . Benjamin Cunningham MD      Copy sentto Dr. Payne Pan, DO    Please note that this chart was generated using voice recognition Dragon dictation software. Although every effort was made to ensure the accuracy of this automated transcription, some errors in transcription may have occurred.

## 2021-02-18 NOTE — GROUP NOTE
Group Therapy Note    Date: 2/18/2021    Group Start Time: 1430  Group End Time: 4895  Group Topic: Recreational    [address], RO[place]        Group Therapy Note    Attendees: 9/16         Patient's Goal:  Develop effective communication skills among peers      Status After Intervention:  Improved    Participation Level:  Active Listener and Interactive    Participation Quality: Appropriate, Attentive, Sharing and Supportive      Speech:  normal      Thought Process/Content: Logical      Affective Functioning: Congruent      Mood: euphoric      Level of consciousness:  Alert, Oriented x4 and Attentive      Response to Learning: Able to verbalize current knowledge/experience, Able to verbalize/acknowledge new learning, Able to retain information and Capable of insight      Modes of Intervention: Education, Support, Socialization, Exploration and Clarifying      Discipline Responsible: Psychoeducational Specialist      Signature:  Carl Cardona

## 2021-02-18 NOTE — BH NOTE
Spoke with patient's father, Javid Paredes, regarding the care his daughter has been receiving while in the hospital. Javid Paredes had a number of concerns mainly regarding staffing, visitation, social distancing, groups, and discharge from the hospital. Javid Paredes stated that his daughter should be getting 1:1 care while in the hospital, and if we cannot meet those needs, she should be transferred to another facility. He stated multiple times that he is her mental health power of  and feels that he is the one who should be making all decisions for the patient. Writer spent a long time discussing the process of how the groups work, safety on the unit, why we currently do not allow visitation, and giving much reassurance. The family was not happy with this information and requested to speak with the DON. Writer offered to have the DON call the family, which family was accepting of, as well as a family meeting with social work. Patients family also demanded last names of staff members or license numbers. Writer explained that in behavioral health we were not able to give out that information for safety reasons.

## 2021-02-18 NOTE — PROGRESS NOTES
BEHAVIORAL HEALTH FOLLOW-UP NOTE     2/18/2021     Patient was seen and examined in person, Chart reviewed   Patient's case discussed with staff/team    Chief Complaint: Psychosis    Interim History:   Patient was admitted yesterday from [facility] emergency department with auditory and visual hallucinations and disorganized behavior. Patient was started on Risperdal 1 mg by mouth twice a day, has been compliant with medications. Per staff patient has been intrusive, touching other patients, and argumentative with staff. This required emergency IM dose of Haldol/Ativan and Geodon IM 3 separate occasions,  last was this morning at 9 AM.  Interviewed patient in her room. Patient endorses auditory hallucinations that sound like phones ringing, denies any command hallucinations. She currently denies any visual hallucinations. Patient appeared paranoid, her behavior was disorganized. She told this writer at one point that she needed to turn the heater down, want to end of bed and attempted to find the heater button at the end of the bed. Patient then sat down and was extremely concerned and using call button to call staff asking for a Covid vaccination. It was explained to patient that the distribution of Covid vaccination was dictated by federal government and that she would be offered it when available, patient was calmer after this. Discussed with patient that she must get consent from an individual prior to touching them, patient was understanding of this and agreed to this. Patient then asked consent to touch writer on hand, help patient stand and reassured her and walked in the hallway to reorient her. She was fixated also on time, she was shown the clock behind the nurses station in order to tell time. Patient reports that she has some anxiety with attending group, that she does not know how to \"act right there\".   Reviewed with patient that she does not have to attend group until medications make her thoughts more clear. She requested this writer call her mother July Ford at [phone]. Spoke with both parents via phone today. Father reports that patient baseline is that she lives alone, currently owns her own business, and is quite independent. Approximately 4 days ago patient began complaining of pain in her head/back with pain central to forehead area. Her brother then started noticing that patient was acting \"strange\". Her father went to see her and patient was claiming that she was seeing people, hearing noises and acting frightened and paranoid. She stated she believed people were going to hurt her from her online business. Father does report that patient was having issues with her landlord, she had complained and felt he was ignoring her. Patient also was having issues with online individuals negative comments, her father believes this contributed to her delusions. Her parents state that she had an episode similar to this in 2014, they cannot remember the name of the medication she was stabilized on. However, father states she was stabilized and eventually weaned off medication and has been medication free for nearly 7 years. Reviewed with family plan of care to include current medication, labs ordered and CT of head. Patient made no complaints this writer of headaches or back pain during our interview.       Appetite:   [x] Normal/Unchanged  [] Increased  [] Decreased      Sleep:       [] Normal/Unchanged  [] Fair       [x] Poor              Energy:    [] Normal/Unchanged  [x] Increased  [] Decreased        Aggression:  [] yes  [x] no    Patient is [] able  [x] unable to CONTRACT FOR SAFETY ON THE UNIT    PAST MEDICAL/PSYCHIATRIC HISTORY:   Past Medical History:   Diagnosis Date    [facility] ([facility] 02/17/2021    Body piercing     ears: helix and lobes    Hypokalemia 02/17/2021    Kidney stones     Sickle [facility] trait ([facility]     Wears glasses        FAMILY/SOCIAL HISTORY:  Family History   Problem Relation Age of Onset    Breast Cancer Maternal Grandmother     Lung Cancer Maternal Grandmother     Heart Disease Maternal Grandmother      Social History     Socioeconomic History    Marital status: Single     Spouse name: Not on file    Number of children: Not on file    Years of education: Not on file    Highest education level: Not on file   Occupational History    Not on file   Social Needs    Financial resource strain: Not on file    Food insecurity     Worry: Not on file     Inability: Not on file    Transportation needs     Medical: Not on file     Non-medical: Not on file   Tobacco Use    Smoking status: Never Smoker    Smokeless tobacco: Never Used   Substance and Sexual Activity    Alcohol use: No    Drug use: Not Currently     Types: Marijuana     Comment: occasional marijuana smoking or eating    Sexual activity: Yes     Partners: Male   Lifestyle    Physical activity     Days per week: Not on file     Minutes per session: Not on file    Stress: Not on file   Relationships    Social connections     Talks on phone: Not on file     Gets together: Not on file     Attends Hindu service: Not on file     Active member of club or organization: Not on file     Attends meetings of clubs or organizations: Not on file     Relationship status: Not on file    Intimate partner violence     Fear of current or ex partner: Not on file     Emotionally abused: Not on file     Physically abused: Not on file     Forced sexual activity: Not on file   Other Topics Concern    Not on file   Social History Narrative    Not on file           ROS:  [x] All negative/unchanged except if checked.  Explain positive(checked items) below:  [] Constitutional  [] Eyes  [] Ear/Nose/Mouth/Throat  [] Respiratory  [] CV  [] GI  []   [] Musculoskeletal  [] Skin/Breast  [] Neurological  [] Endocrine  [] Heme/Lymph  [] Allergic/Immunologic    Explanation:     MEDICATIONS:    Current Facility-Administered Medications:     risperiDONE (RISPERDAL) tablet 2 mg, 2 mg, Oral, BID, ELIGIO Rodriguez - CNP    LORazepam (ATIVAN) tablet 1 mg, 1 mg, Oral, Once PRN, ELIGIO Rodriguez - CNP    LORazepam (ATIVAN) injection 2 mg, 2 mg, Intramuscular, Once, Kevin Gonzáles MD    acetaminophen (TYLENOL) tablet 650 mg, 650 mg, Oral, Q4H PRN, Nidia Mata MD    aluminum & magnesium hydroxide-simethicone (MAALOX) 200-200-20 MG/5ML suspension 30 mL, 30 mL, Oral, Q6H PRN, Nidia Mata MD    haloperidol lactate (HALDOL) injection 5 mg, 5 mg, Intramuscular, Q4H PRN, 5 mg at 02/18/21 0932 **AND** diphenhydrAMINE (BENADRYL) injection 25 mg, 25 mg, Intramuscular, Q4H PRN, Nidia Mata MD, 25 mg at 02/18/21 0932    haloperidol (HALDOL) tablet 5 mg, 5 mg, Oral, Q4H PRN **AND** LORazepam (ATIVAN) tablet 2 mg, 2 mg, Oral, Q4H PRN, Nidia Mata MD    hydrOXYzine (ATARAX) tablet 50 mg, 50 mg, Oral, TID PRN, Nidia Mata MD    traZODone (DESYREL) tablet 50 mg, 50 mg, Oral, Nightly PRN, Nidia Mata MD    polyethylene glycol (GLYCOLAX) packet 17 g, 17 g, Oral, Daily PRN, Nidia Mata MD    ibuprofen (ADVIL;MOTRIN) tablet 400 mg, 400 mg, Oral, Q6H PRN, Nidia Mata MD    potassium chloride (KLOR-CON M) extended release tablet 20 mEq, 20 mEq, Oral, BID , Herb Anthony MD, 20 mEq at 02/18/21 0837    ziprasidone (GEODON) injection 20 mg, 20 mg, Intramuscular, Q12H PRN, 20 mg at 02/17/21 2312 **AND** sterile water injection 1.2 mL, 1.2 mL, Intramuscular, Q12H PRN, Rick Vazquez MD, 1.2 mL at 02/17/21 2312      Examination:  /67   Pulse 108   Temp 98 °F (36.7 °C) (Oral)   Resp 14   Ht 6' 1\" (1.854 m)   Wt 235 lb (106.6 kg)   LMP 02/01/2021 (Approximate)   SpO2 98%   BMI 31.00 kg/m²   Gait - steady  Medication side effects(SE): None reported or observed by staff    Mental Status Examination:    Level of consciousness: Alert and oriented  Appearance: Dressed casually, wearing blanket overhead, disheveled with good hygiene  Behavior/Motor: Approachable appears paranoid, fidgeting and restless  Attitude toward examiner: Slightly suspicious, cooperative  Mood: Patient would not answer this question  Affect: Appears paranoid  Thought processes: Disorganized, thought blocking and occasional flight of idea  Thought content:  Homicidal ideation - none  Suicidal Ideation:  denies suicidal ideation  Delusions: Paranoid  Perceptual Disturbance: Auditory hallucinations not command in nature  Cognition: Oriented to person, but she is in the hospital but not circumstance  Concentration poor  Insight impaired  Judgement impaired    ASSESSMENT:   Patient symptoms are:  [] Well controlled  [] Improving  [x] Worsening  [] No change      Diagnosis:   Principal Problem:    Acute psychosis ([facility]  Active Problems:    Hypokalemia  Resolved Problems:    * No resolved hospital problems.  *      LABS:    Recent Labs     02/16/21  0842   WBC 10.1   HGB 12.4        Recent Labs     02/16/21  0842      K 3.2*      CO2 22   BUN 10   CREATININE 0.75   GLUCOSE 105*     Recent Labs     02/16/21  0842   BILITOT 0.53   ALKPHOS 67   AST 16   ALT 9     Lab Results   Component Value Date    BARBSCNU NEGATIVE 04/17/2014    LABBENZ NEGATIVE 04/17/2014    LABMETH NEGATIVE 04/17/2014    PPXUR NOT REPORTED 04/17/2014     Lab Results   Component Value Date    TSH 1.13 04/17/2014     No results found for: LITHIUM  No results found for: VALPROATE, CBMZ      Treatment Plan:  Reviewed current Medications with patient and family  Increase risperidone to 2 mg by mouth twice a day  Urinalysis  Urine drug tox  CT of the head without contrast  Please give 1 mg of Ativan by mouth prior to CT scan  Please place patient in line of sight due to inability to contract for safety  Please provide patient with frequent reorientation to include time/date  Email with attachment from patient's father forwarded to manager and attending psychiatric physician for review  Risks, benefits, side effects, drug-to-drug interactions and alternatives to treatment were discussed. Encourage patient to attend group and other milieu activities when ready  Discharge planning discussed with the patient and treatment team.  Follow-up daily while inpatient    PSYCHOTHERAPY/COUNSELING:  [] Therapeutic interview  [x] Supportive  [] CBT  [] Ongoing  [] Other    [x] Patient continues to need, on a daily basis, active treatment furnished directly by or requiring the supervision of inpatient psychiatric personnel      Anticipated Length of stay: Hira Anthony is a 31 y.o. female being evaluated face to face. --ELIGIO Willis - CNP on 2/18/2021 at 1:44 PM    An electronic signature was used to authenticate this note. **This report has been created using voice recognition software. It may contain minor errors which are inherent in voice recognition technology. **    I independently saw and evaluated the patient. I reviewed the midlevel provider's documentation above. Any additional comments or changes to the midlevel provider's documentation are stated below otherwise agree with assessment. The patient was seen at bedside with the medical student. The patient states that she has been working from home using a camera and a screen. The patient alluded to doing adult work. The patient was thought disordered and was sometimes difficult to follow. She was very anxious. She believes that she is being watched by her parents through those cameras when she is at home. The patient has been compliant with medications and she reports no side effects. Spoke to father- He believes that patient has shown a lot of tenacity in overcoming her delusional symptoms. He is appreciative of medications she has been started on.   Father believes that family visits would be a key part of her improvement. Father says that patient lives with them. Father is dissatisfied with the care she has received but it is difficult to ascertain exactly what he is unhappy about. Father believes she is not getting the intense treatments and therapy. Patient believes that there should be more staff. Father is agreeable with medication treatment. Father wants her to be transferred to facility where family can visit. Discussed that acute psychiatric facilities in [place] are unlikely to be allowing family visits due to risk of Covid tranmission. Father wants to contact a facility in [place] which he believes will provide better care. PLAN  Medications as noted above  Attempt to develop insight  Psycho-education conducted. Supportive Therapy conducted.   Probable discharge is as noted above  Follow-up daily while on inpatient unit    Electronically signed by Clare Reis MD on 2/18/21 at 3:55 PM EST

## 2021-02-18 NOTE — CARE COORDINATION
SOCIAL SERVICE NOTE: COLLEEN telephones PT mother Cha Ahmadi to return message at [phone]. SW left message on voicemail  with SW call back information. SW will continue to monitor the situation.

## 2021-02-18 NOTE — GROUP NOTE
Group Therapy Note    Date: 2/17/2021    Group Start Time: 2030  Group End Time: 2100  Group Topic: Wrap-Up    [facility] [place]        Group Therapy Note    Attendees: 12 patients for wrap up positivity/gratitude group     Patient was intense, focused on others and staff, persecutory delusions, forceful, verbally demanding and intimidating of another peer.  She was asked to leave group and was medicated for agitation      Signature:  Adilson Smith

## 2021-02-18 NOTE — GROUP NOTE
Group Therapy Note    Date: 2/18/2021    Group Start Time: 1600  Group End Time: 36  Group Topic: Healthy Living/Wellness    [facility]    Jaquelin Mina        Group Therapy Note    Attendees: 6       Patient's Goal:  Wellness/Education    Status After Intervention:  Improved    Participation Level:  Active Listener    Participation Quality: Appropriate and Attentive      Speech:  normal      Thought Process/Content: Logical      Affective Functioning: Flat      Mood: depressed      Level of consciousness:  Alert and Oriented x4      Response to Learning: Capable of insight      Endings: None Reported    Modes of Intervention: Education and Socialization      Discipline Responsible: [employer]      Signature:  Prabha La

## 2021-02-19 VITALS
DIASTOLIC BLOOD PRESSURE: 70 MMHG | OXYGEN SATURATION: 98 % | RESPIRATION RATE: 16 BRPM | SYSTOLIC BLOOD PRESSURE: 113 MMHG | TEMPERATURE: 97.3 F | HEART RATE: 96 BPM | WEIGHT: 235 LBS | BODY MASS INDEX: 31.83 KG/M2 | HEIGHT: 72 IN

## 2021-02-19 LAB
ANION GAP SERPL CALCULATED.3IONS-SCNC: 9 MMOL/L (ref 9–17)
BUN BLDV-MCNC: 6 MG/DL (ref 6–20)
BUN/CREAT BLD: ABNORMAL (ref 9–20)
CALCIUM SERPL-MCNC: 9 MG/DL (ref 8.6–10.4)
CHLORIDE BLD-SCNC: 102 MMOL/L (ref 98–107)
CO2: 27 MMOL/L (ref 20–31)
CREAT SERPL-MCNC: 0.79 MG/DL (ref 0.5–0.9)
GFR AFRICAN AMERICAN: >60 ML/MIN
GFR NON-AFRICAN AMERICAN: >60 ML/MIN
GFR SERPL CREATININE-BSD FRML MDRD: ABNORMAL ML/MIN/{1.73_M2}
GFR SERPL CREATININE-BSD FRML MDRD: ABNORMAL ML/MIN/{1.73_M2}
GLUCOSE BLD-MCNC: 111 MG/DL (ref 70–99)
POTASSIUM SERPL-SCNC: 3.6 MMOL/L (ref 3.7–5.3)
SODIUM BLD-SCNC: 138 MMOL/L (ref 135–144)

## 2021-02-19 PROCEDURE — 80048 BASIC METABOLIC PNL TOTAL CA: CPT

## 2021-02-19 PROCEDURE — 99231 SBSQ HOSP IP/OBS SF/LOW 25: CPT | Performed by: INTERNAL MEDICINE

## 2021-02-19 PROCEDURE — 6370000000 HC RX 637 (ALT 250 FOR IP): Performed by: NURSE PRACTITIONER

## 2021-02-19 PROCEDURE — 99239 HOSP IP/OBS DSCHRG MGMT >30: CPT | Performed by: PSYCHIATRY & NEUROLOGY

## 2021-02-19 PROCEDURE — 6370000000 HC RX 637 (ALT 250 FOR IP): Performed by: INTERNAL MEDICINE

## 2021-02-19 PROCEDURE — 36415 COLL VENOUS BLD VENIPUNCTURE: CPT

## 2021-02-19 RX ORDER — RISPERIDONE 2 MG/1
2 TABLET, FILM COATED ORAL 2 TIMES DAILY
Qty: 60 TABLET | Refills: 3 | Status: SHIPPED | OUTPATIENT
Start: 2021-02-19

## 2021-02-19 RX ADMIN — POTASSIUM CHLORIDE 20 MEQ: 1500 TABLET, EXTENDED RELEASE ORAL at 08:49

## 2021-02-19 RX ADMIN — RISPERIDONE 2 MG: 2 TABLET ORAL at 08:49

## 2021-02-19 NOTE — PLAN OF CARE
Problem: Altered Mood, Psychotic Behavior:  Goal: Able to verbalize decrease in frequency and intensity of hallucinations  Description: Able to verbalize decrease in frequency and intensity of hallucinations  2/19/2021 1019 by Geneva Fonseca RN  Outcome: Ongoing  Note: Patient denies any hallucinations at this time and not not appear to be responding to internal stimuli. Patient requires redirection frequently and often trying to push limits set by staff. Patient is compliant with medications. Patient placed on line of sight observation for patient safety.       Problem: Altered Mood, Psychotic Behavior:  Goal: Able to verbalize reality based thinking  Description: Able to verbalize reality based thinking  2/19/2021 1019 by Geneva Fonseca RN  Outcome: Ongoing

## 2021-02-19 NOTE — CONSULTS
[address] [facility]  / FOLLOW UP VISIT       Date:   2/19/2021  Patient name:  Jd Diallo  Date of admission:  2/17/2021  1:32 AM  MRN:   [id number]  Account:  [de-identified]  YOB: 1990  PCP:    Delmy Lacey DO  Room:   [address] [place] [address]  Code Status:    Full Code    Physician Requesting Consult: Rikki Wells MD    History of Present Illness:      C/C ;     REASON FOR CONSULT;   hypokalemia   Medical comorbidity and medication management ;  Principal Problem:    Acute psychosis ([facility]  Active Problems:    Hypokalemia  Resolved Problems:    * No resolved hospital problems. *            Subjective:     Chief Complaint   Patient presents with    Back Pain    Psychiatric Evaluation         Principal Problem:    Acute psychosis ([facility]  Active Problems:    Hypokalemia  Resolved Problems:    * No resolved hospital problems. *       HPI     k 3.2   Not on diuretic     2/18/21   on k replacement will check bmp in am    2/19/21  Bmp awaited      Significant last 24 hr data reviewed [x] yes        Significant last 24 hr data reviewed ;   Vitals:    02/17/21 2022 02/18/21 0745 02/18/21 2230 02/19/21 0814   BP: (!) 142/92 101/67 (!) 111/93 113/70   Pulse: 109 108 103 96   Resp: 14 14 16 16   Temp:  98 °F (36.7 °C) 98.4 °F (36.9 °C) 97.3 °F (36.3 °C)   TempSrc: Oral Oral Oral Oral   SpO2:  98%     Weight:       Height:         No results for input(s): POCGLU in the last 72 hours. No results found for this or any previous visit (from the past 24 hour(s)). No results for input(s): POCGLU in the last 72 hours. No results found.          --  Hx on Admission;        Past Medical History:   Diagnosis Date    Acute psychosis ([facility] 02/17/2021    Body piercing     ears: helix and lobes    Hypokalemia 02/17/2021    Kidney stones     Sickle cell trait ([facility]     Wears glasses        Family History   Problem Relation Age of Onset    Breast Cancer Maternal Grandmother     Lung Cancer Maternal Grandmother     Heart Disease Maternal Grandmother        Social History:     Tobacco:    reports that she has never smoked. She has never used smokeless tobacco.  Alcohol:      reports no history of alcohol use. Neurological ;                 No focal motor deficit ,                 No focal sensory deficit ,  Drug Use: reports previous drug use. Drug: Marijuana. Review of Systems:     POSITIVE AND NEGATIVES AS DESCRIBED IN HISTORY OF PRESENT ILLNESS ;  IN ADDITION ;   Review of Systems          All other systems negative              Physical Exam:     Vitals:  /70   Pulse 96   Temp 97.3 °F (36.3 °C) (Oral)   Resp 16   Ht 6' 1\" (1.854 m)   Wt 235 lb (106.6 kg)   LMP 02/01/2021 (Approximate)   SpO2 98%   BMI 31.00 kg/m²                 Body mass index is 31 kg/m². General Appearance:   Alert , CO-OPERATIVE ,     H E:E N T      No icterus, no pallor . No ptosis. No gross asymmetry  Or abnormality face            Skin:                             No rash or erythema  Neck:                            No mass , no thyroid enlargement                                           Pulmonary/Chest:        Clear to auscultation bilaterally . No wheezes, rales or rhonchi . Cardiovascular:            Normal rate, regular rhythm,                                          No murmur orGallop . Abdomen:                       Soft, non-tender                                           Normalbowels sounds,                                             Extremities:                    No  Edema .              Data:     Labs:    URINE ANALYSIS: No results found for: LABURIN     CBC:  Lab Results   Component Value Date    WBC 10.1 02/16/2021    HGB 12.4 02/16/2021     02/16/2021        BMP:    Lab Results   Component Value Date    NA 135 02/16/2021    K 3.2 02/16/2021     02/16/2021    CO2 22 02/16/2021    BUN 10 02/16/2021    CREATININE 0.75 02/16/2021    GLUCOSE 105 02/16/2021      LIVER PROFILE:  Lab Results   Component Value Date    ALT 9 02/16/2021    AST 16 02/16/2021    PROT 8.2 02/16/2021    BILITOT 0.53 02/16/2021    LABALBU 4.3 02/16/2021              Radiology:         Medications: Allergies: Allergies   Allergen Reactions    Latex Hives and Other (See Comments)    Honey Hives     Pt.states she broke out in hives when raw honey was applied.  Bee Venom Swelling    Contrast [Iodides]      Abstracted from [facility]- patient could not confirm this allergy with me on 2-17-21- states she is not sure. SP 2-17-21. Current Meds:   Scheduled Meds:    risperiDONE  2 mg Oral BID    LORazepam  2 mg Intramuscular Once    potassium chloride  20 mEq Oral BID WC     Continuous Infusions:   PRN Meds: acetaminophen, aluminum & magnesium hydroxide-simethicone, haloperidol lactate **AND** diphenhydrAMINE, haloperidol **AND** LORazepam, hydrOXYzine, traZODone, polyethylene glycol, ibuprofen, ziprasidone **AND** sterile water    Assessment :      Primary Problem  Principal Problem:    Acute psychosis ([address]  Active Problems:    Hypokalemia  Resolved Problems:    * No resolved hospital problems. *       No results for input(s): POCGLU in the last 72 hours. Vitals:    02/17/21 2022 02/18/21 0745 02/18/21 2230 02/19/21 0814   BP: (!) 142/92 101/67 (!) 111/93 113/70   Pulse: 109 108 103 96   Resp: 14 14 16 16   Temp:  98 °F (36.7 °C) 98.4 °F (36.9 °C) 97.3 °F (36.3 °C)   TempSrc: Oral Oral Oral Oral   SpO2:  98%     Weight:       Height:             Plan:      has hypokalemia k 3.2     2/19/2021      · Bmp pending  1. Will decide about k after results back                     Thanks for consulting us . Will monitor vitals and clinical course , and  Optimize therapy  as needed .        Alysa Ty MD      Copy sentto  [address], DO    Please note that this chart was generated using voice recognition Dragon dictation software. Although every effort was made to ensure the accuracy of this automated transcription, some errors in transcription may have occurred.

## 2021-02-19 NOTE — GROUP NOTE
Group Therapy Note    Date: 2/19/2021    Group Start Time: 1330  Group End Time: 2933  Group Topic: Psychoeducation    [address], LSW    patient refused to attend psychoeducation group at 130pm after encouragement from staff.   1:1 talk time provided as alternative to group session

## 2021-02-19 NOTE — GROUP NOTE
Group Therapy Note    Date: 2/19/2021    Group Start Time: 0900  Group End Time: 0920  Group Topic: Community Meeting    [facility]    Patient refused to attend community meeting and goal setting group at 0900 after encouragement from staff. 1:1 talk time offered by staff as alternative to group session.

## 2021-02-19 NOTE — GROUP NOTE
Group Therapy Note    Date: 2/19/2021    Group Start Time: 1100  Group End Time: 1140  Group Topic: Psychoeducation    KEV Olivera, CTRS    Patient refused to attend leisure skills group at 1100 after encouragement from staff. 1:1 talk time offered by staff as alternative to group session.

## 2021-02-19 NOTE — GROUP NOTE
Group Therapy Note    Date: 2/19/2021    Group Start Time: 1430  Group End Time: 6053  Group Topic: Cognitive Skills    [address], ROEL        Group Therapy Note    Attendees:9/17    patient refused to attend critical thinking and communication skills group at [phone] after encouragement from staff. 1:1 talk time provided as alternative to group session.        Signature:  Radha Agarwal [place]

## 2021-02-19 NOTE — BH NOTE
[address] [facility]  Discharge Note    Pt discharged with followings belongings:   Dentures: None  Vision - Corrective Lenses: None  Hearing Aid: None  Jewelry: None  Body Piercings Removed: No  Clothing: Socks, Shirt, Other (Comment)(bra)  Were All Patient Medications Collected?: No  Other Valuables: Money (Comment), Wallet(147.00 dollars, visa, 2 insurance cards, social security card)   Valuables sent home with patient  . Valuables retrieved from safe, Security envelope number:  [phone] and returned to patient. Patient education on aftercare instructions: done  Information faxed to n/a by n/a Patient verbalize understanding of AVS:  yes.     Status EXAM upon discharge:  Status and Exam  Normal: No  Facial Expression: Flat  Affect: Inappropriate, Blunt  Level of Consciousness: Alert  Mood:Normal: No  Mood: Anxious, Irritable  Motor Activity:Normal: No  Motor Activity: Increased  Interview Behavior: Uncooperative/Withdrawn, Evasive  Preception: [place] to Person, [place] to Time, [place] to Place, [place] to Situation  Attention:Normal: No  Attention: Distractible  Thought Processes: Flt.of Ideas  Thought Content:Normal: No  Thought Content: Preoccupations, Paranoia, Delusions  Hallucinations: None  Delusions: No  Delusions: Persecution, Obsessions  Memory:Normal: No  Memory: Poor Recent, Poor Remote  Insight and Judgment: No  Insight and Judgment: Poor Judgment, Poor Insight  Present Suicidal Ideation: No  Present Homicidal Ideation: No      Metabolic Screening:    No results found for: LABA1C    No results found for: CHOL  No results found for: TRIG  No results found for: HDL  No components found for: LDLCAL  No results found for: Violette Ribera, JUANITA    Patient discharged with mother to private residence

## 2021-02-19 NOTE — SUICIDE SAFETY PLAN
SAFETY PLAN     A suicide Safety Plan is a document that supports someone when they are having thoughts of suicide. Warning Signs that indicate a suicidal crisis may be developing: What (situations, thoughts, feelings, body sensations, behaviors, etc.) do you experience that lets you know you are beginning to think about suicide? 1. I don't take my medications   2. I can't sleep      2. Internal Coping Strategies:  What things can I do (relaxation techniques, hobbies, physical activities, etc.) to take my mind off my problems without contacting another person? A. Coping skills/ strategies - journal/ listen to music/ go for a walk/ read a book/ watch a funny movie/show / crafts / video game   B. Grounding techniques- eat a sour candy or hot cinnamon candy / focus on colors, sounds, smells, textures on things around you /  drink some herbal tea / eat a piece of dark chocolate / take a hot bath or shower / essential oils for smelling / meditate / color / arts and crafts         People whom I can ask for help: Who can I call when I need help - for example, friends, family, clergy, someone else? 1. My dad  2. My mom    Parents, relatives, friends, /, , coworkers, support group, therapist, doctor - park, support group, gym, Restorationist, etc     Professionals or [address] [place] [address] I can contact during a crisis: Who can I call for help - for example, my doctor, my psychiatrist, my psychologist, a mental health provider, a suicide hotline? 1. Follow-up with mercy provider. 2. Suicide Prevention Lifeline: 0-567-994-TALK (1322)  3. Rescue Crisis: Emergency Services Address: [address] [facility], [place],  [facility] [place] [facility], Phone: [phone]  4. [place] [address] Emergency Services -  for example, [address], [facility] suicide hotline,   1. [place] [address], 3-846-417-884.389.8395  2.  Mental Health & Recovery Services Board of [place], Crisis line: [phone]  3. [employer] (Crisis Intervention Team - CIT), [phone] or 9-1-1  4. [facility], 7-730.683.7105  5. National Association of Mental Illness, 2-865-122-197.138.3832  6. [facility] Substance Abuse National Helpline, 2-096-895-HELP (0055)  7. Crisis Text Line, Text 4HOPE to 790533 to connect with a crisis counselor  8. [facility], 5-143.733.1936  9. RAINN ([address]), [phone]      COVID-19 Emotional Support Line: [phone]  Call the Recovery Helpline at [address] [place] [address] Drive: 7-863-998-TALK (1818)  Text \"4Hope\" to [address]     1. BellyBio  Free areli that teaches a deep breathing technique useful in fighting anxiety and stress. A simple interface uses biofeedback to monitor your breathing. Sounds cascade with the movements of your belly, in rhythms reminiscent of waves on a beach. Charts also let you know how you're doing. A great tool when you need to slow down and breathe. 2. Operation Reach Out  Literally a lifesaving areli, this free intervention tool helps people who are having suicidal thoughts to reassess their thinking and get help. Recommended by followers of, who report that this areli has helped in suicidal crises. Developed by the [employer], but useful to all. [place] a download even if you're not suicidal. You never know if you might need it. 3. eCBT Calm  Provides a set of tools to help you evaluate personal stress and anxiety, challenge distorted thoughts, and learn relaxation skills that have been scientifically validated in research on Cognitive Behavioral Therapy (CBT). Lots of background and useful information along with step-by-step guides. 4. Deep Sleep with [employer] [place]  Getting enough sleep is one of the foundations of mental health.  A personal favorite I listen to all the time, this straightforward areli features a warm, gentle voice guiding listeners through a Progressive Muscle Relaxation (PMR) session and into sleep. Features long or short induction options, and an alarm. 5. WhatsMyM3  A three minute depression and anxiety screen. Validated questionnaires assess symptoms of depression, anxiety, bipolar disorder, and PTSD, and combine into a score that indicates whether or not your life is impacted significantly by a mood disorder, recommending a course of action. The areli keeps a history of test results, to help you track your progress. 6. DBT Diary Card and Skills   Based on Dialectical Behavior Therapy (DBT) developed by psychologist Paty Cuevas, this areli is a rich resource of self-help skills, reminders of the therapy principles, and coaching tools for coping. Created by a therapist with years of experience in the practice, this areli is not intended to replace a professional but helps people reinforce their treatment. 7. Optimism  Track your moods, keep a journal, and chart your recovery progress with this comprehensive tool for depression, bipolar disorder, and anxiety disorders. One of the most popular mood tracking apps available, with plenty of features. Free. 8. iSleepEasy  A calm female voice helps you quell anxieties and take the time to relax and sleep, in an array of guided meditations. Separately controlled voice and music tracks, flexible lengths, and an alarm. Includes a special wee hours rescue track, and tips for falling asleep. Developed by [employer], who offer an great line of relaxation apps. 9. [address]  Not technically a mental health areli, it makes no miraculous claims about curbing anxiety. However, there is independent research indicating that taking breaks and getting exposure to nature, even in videos, can reduce stress.  This areli offers an assortment of peaceful, ambient nature scenes from beautiful spots around the world. 10. Relax Melodies  A popular free relaxation sound and music areli. Mix and match nature sounds with new age music; it's tamiko to listen to birds in the rain while a piano softly plays. Making the environment safe: How can I make my environment (house/apartment/living space) safer? For example, can I remove guns, medications, and other items? 1. Attend follow-up appointments. 2. Engage in aftercare for mental health treatment; individual counseling and medication management.

## 2021-02-19 NOTE — DISCHARGE SUMMARY
DISCHARGE SUMMARY      Patient ID:  Vashti Newell  [id number]  31 y.o.  1990    Admit date: 2/17/2021    Discharge date and time: 2/19/2021    Disposition: Home     Admitting Physician: Juan Carlson MD     Discharge Physician: Dr Melchor Pena MD    Admission Diagnoses: Acute psychosis [facility]) [F23]    Admission Condition: poor    Discharged Condition: stable    Admission Circumstance: Vashti Newell is a 31 y.o. female with significant past medical history of acute psychosis who presented to the ED [facility] with complaints of auditory and visual hallucinations. Patient provides very scattered information, so much information is obtained from the emergency department note. Patient states that she had been seeing people at her house that are not actually there and stating that they have been intermittently talking to her. Patient stated that the snow is making noise to her and speaking to her. She also stated that the noise through her window is telling her things. She stated that the TV is loud and tells her to do things and she can hear it from any TV in any room. She seems quite paranoid upon assessment and stated that things keep being done to her and her family and things are wrong that they have spoken to their landlord about but nothing is getting done. She was denying suicidal or homicidal ideations in the emergency department. The [facility] to [facility], but transportation was delayed due to weather conditions. Her father brought her to [facility] emergency department several hours later with the complaint of psychosis. He stated that she had not been able to sleep over the past 3 days and that she has become preoccupied and paranoid with the Internet and afraid of what was going on in the world today. To be given Ativan in the emergency department as she attempted to leave multiple times psychomotor agitation noted.   Upon assessment, she answer some questions, and becomes very defensive to others. She refuses to answer some questions. She is still very agitated and behavior is bizarre. She grabbed this writer's identification, and wrote down all of the [place] emergency code colors on her paper. She also repeatedly wrote \"Minerva\" and \"Dr. Tyler Maldonado" in various places on her paper as her providers. She became tearful at times during assessment. Patient had to be given Ativan and Haldol due to agitation early this afternoon. She did consent for certain staff to talk to her father, but did not want the nurse practitioner to talk to her father.        PAST MEDICAL/PSYCHIATRIC HISTORY:   Past Medical History:   Diagnosis Date    Acute psychosis ([facility] [address] 02/17/2021    Body piercing     ears: helix and lobes    Hypokalemia 02/17/2021    Kidney stones     Sickle cell trait ([facility] [address]     Wears glasses        FAMILY/SOCIAL HISTORY:  Family History   Problem Relation Age of Onset    Breast Cancer Maternal Grandmother     Lung Cancer Maternal Grandmother     Heart Disease Maternal Grandmother      Social History     Socioeconomic History    Marital status: Single     Spouse name: Not on file    Number of children: Not on file    Years of education: Not on file    Highest education level: Not on file   Occupational History    Not on file   Social Needs    Financial resource strain: Not on file    Food insecurity     Worry: Not on file     Inability: Not on file   [employer] needs     Medical: Not on file     Non-medical: Not on file   Tobacco Use    Smoking status: Never Smoker    Smokeless tobacco: Never Used   Substance and Sexual Activity    Alcohol use: No    Drug use: Not Currently     Types: Marijuana     Comment: occasional marijuana smoking or eating    Sexual activity: Yes     Partners: Male   Lifestyle    Physical activity     Days per week: Not on file     Minutes per session: Not on file    Stress: Not on file Relationships    Social connections     Talks on phone: Not on file     Gets together: Not on file     Attends Orthodoxy service: Not on file     Active member of club or organization: Not on file     Attends meetings of clubs or organizations: Not on file     Relationship status: Not on file    Intimate partner violence     Fear of current or ex partner: Not on file     Emotionally abused: Not on file     Physically abused: Not on file     Forced sexual activity: Not on file   Other Topics Concern    Not on file   Social History Narrative    Not on file       MEDICATIONS:    Current Facility-Administered Medications:     risperiDONE (RISPERDAL) tablet 2 mg, 2 mg, Oral, BID, Agnes Hutchins, APRN - CNP, 2 mg at 02/19/21 0849    LORazepam (ATIVAN) injection 2 mg, 2 mg, Intramuscular, Once, Huy Verduzco MD    acetaminophen (TYLENOL) tablet 650 mg, 650 mg, Oral, Q4H PRN, Lexis Petit MD    aluminum & magnesium hydroxide-simethicone (MAALOX) 200-200-20 MG/5ML suspension 30 mL, 30 mL, Oral, Q6H PRN, Lexis Petit MD    haloperidol lactate (HALDOL) injection 5 mg, 5 mg, Intramuscular, Q4H PRN, 5 mg at 02/18/21 0932 **AND** diphenhydrAMINE (BENADRYL) injection 25 mg, 25 mg, Intramuscular, Q4H PRN, Lexis Petit MD, 25 mg at 02/18/21 0932    haloperidol (HALDOL) tablet 5 mg, 5 mg, Oral, Q4H PRN **AND** LORazepam (ATIVAN) tablet 2 mg, 2 mg, Oral, Q4H PRN, Lexis Petit MD    hydrOXYzine (ATARAX) tablet 50 mg, 50 mg, Oral, TID PRN, Lexis Petit MD, 50 mg at 02/18/21 1447    traZODone (DESYREL) tablet 50 mg, 50 mg, Oral, Nightly PRN, Lexis Petit MD, 50 mg at 02/18/21 2106    polyethylene glycol (GLYCOLAX) packet 17 g, 17 g, Oral, Daily PRN, Lexis Petit MD    ibuprofen (ADVIL;MOTRIN) tablet 400 mg, 400 mg, Oral, Q6H PRN, Lexis Petit MD    potassium chloride (KLOR-CON M) extended release tablet 20 mEq, 20 mEq, Oral, BID WC, Memo Parker MD, 20 mEq at 02/19/21 0800   ziprasidone (GEODON) injection 20 mg, 20 mg, Intramuscular, Q12H PRN, 20 mg at 02/17/21 2312 **AND** sterile water injection 1.2 mL, 1.2 mL, Intramuscular, Q12H PRN, Rikki Wells MD, 1.2 mL at 02/17/21 2312    Examination:  /70   Pulse 96   Temp 97.3 °F (36.3 °C) (Oral)   Resp 16   Ht 6' 1\" (1.854 m)   Wt 235 lb (106.6 kg)   LMP 02/01/2021 (Approximate)   SpO2 98%   BMI 31.00 kg/m²   Gait - steady    HOSPITAL COURSE[de-identified]  Following admission to the hospital, patient had a complete physical exam and blood work up, which was unremarkable. Patient was monitored closely with suicide precaution  Patient was started on risperidone which was increased to 2 mg twice a day. I have spoken to the patient's father who has a mental health power of  since 2014 and the patient last had a psychotic episode. The patient's father expressed the belief that she would be better cared for at home. He also wanted to consider transferring her to a different facility because he felt that [facility] was not a suitable facility. The patient was seen on the day of discharge. She was able to have a coherent conversation and expressed her desire to leave the hospital.  She also had a letter that she had written down to express her wishes to leave the hospital.  She was willing to continue taking medication in the outpatient setting. It was noted that the patient had not received medication for agitation in the 24-hours prior to discharge. Was encouraged to participate in group and other milieu activity  Patient started to feel better with this combination of treatment. Significant progress in the symptoms since admission.     Mood is improved  The patient denies AVH or paranoid thoughts  The patient denies any hopelessness or worthlessness  No active SI/HI  Appetite:  [x] Normal  [] Increased  [] Decreased    Sleep:       [x] Normal  [] Fair       [] Poor            Energy:    [x] Normal  [] Increased [] Decreased     SI [] Present  [x] Absent  HI  []Present  [x] Absent   Aggression:  [] yes  [] no  Patient is [x] able  [] unable to CONTRACT FOR SAFETY   Medication side effects(SE):  [x] None(Psych. Meds.) [] Other      Mental Status Examination on discharge:    Level of consciousness:  within normal limits   Appearance:  well-appearing  Behavior/Motor:  no abnormalities noted  Attitude toward examiner:  attentive and good eye contact  Speech:  spontaneous, normal rate and normal volume   Mood: constricted  Affect:  mood congruent  Thought processes:  linear, goal directed and coherent   Thought content:  Suicidal Ideation:  denies suicidal ideation  Delusions:  no evidence of delusions  Perceptual Disturbance:  denies any perceptual disturbance  Cognition:  oriented to person, place, and time   Concentration intact  Memory intact  Insight good   Judgement fair   Fund of Knowledge adequate      ASSESSMENT:  Patient symptoms are:  [x] Well controlled  [x] Improving  [] Worsening  [] No change      Diagnosis:  Principal Problem:    Acute psychosis ([facility] [address]  Active Problems:    Hypokalemia  Resolved Problems:    * No resolved hospital problems. *      LABS:    No results for input(s): WBC, HGB, PLT in the last 72 hours. Recent Labs     02/19/21  1317      K 3.6*      CO2 27   BUN 6   CREATININE 0.79   GLUCOSE 111*     No results for input(s): BILITOT, ALKPHOS, AST, ALT in the last 72 hours. Lab Results   Component Value Date    BARBSCNU NEGATIVE 04/17/2014    LABBENZ NEGATIVE 04/17/2014    LABMETH NEGATIVE 04/17/2014    PPXUR NOT REPORTED 04/17/2014     Lab Results   Component Value Date    TSH 1.13 04/17/2014     No results found for: LITHIUM  No results found for: VALPROATE, CBMZ    RISK ASSESSMENT AT DISCHARGE: Low risk for suicide and homicide. Treatment Plan:  Reviewed current Medications with the patient. Education provided on the complaince with treatment.     Risks, benefits, side effects, drug-to-drug interactions and alternatives to treatment were discussed. Encourage patient to attend outpatient follow up appointment and therapy. Patient was advised to call the outpatient provider, visit the nearest ED or call 911 if symptoms are not manageable. Patient's family member was contacted prior to the discharge. Medication List      STOP taking these medications    cephALEXin 250 MG capsule  Commonly known as: [facility] EVALUATION, DISCHARGE SUMMARY, MEDICATION RECONCILIATION AND FOLLOW UP CARE                                         Kendall Alvarez is a 31 y.o. female being evaluated Berenice Razo MD on 2/19/2021 at 1:53 PM    An electronic signature was used to authenticate this note. **This report has been created using voice recognition software. It may contain minor errors which are inherent in voice recognition technology. **

## 2021-02-19 NOTE — GROUP NOTE
Group Therapy Note    Date: 2/19/2021    Group Start Time: 1000  Group End Time: 4004  Group Topic: Recreational    [address], [facility]        Group Therapy Note    Attendees: 8/18    patient refused to attend self esteem group at [phone] after encouragement from staff.   1:1 talk time provided as alternative to group session            Signature:  Ritu Ballesteros, [address]

## 2021-02-19 NOTE — CARE COORDINATION
Spoke with pt's father. Father stated he would be picking her up from hospital today. Father stated that family would be taking care of follow-up appointments. Pt's father refused assistance by writer for father. Father reported that pt sees a doctor through [facility] ([facility] Per pt she will follow-up with providers her parents refer her to.

## 2021-02-20 ASSESSMENT — ENCOUNTER SYMPTOMS
VOMITING: 0
SHORTNESS OF BREATH: 0
BACK PAIN: 0
COUGH: 0
ABDOMINAL PAIN: 0
DIARRHEA: 0

## 2021-02-22 ENCOUNTER — TELEPHONE (OUTPATIENT)
Dept: PSYCHIATRY | Age: 31
End: 2021-02-22

## 2021-02-22 NOTE — TELEPHONE ENCOUNTER
Patient was recently discharged. She called in regards to her risperidone. States she would like the medication reduce to 1 mg instead of 2 mg. I looked through her discharge paperwork and see that she was not set up with a psychiatrist to follow.  Are you able to adjust her medications, while she finds a psychiatrist?

## 2021-02-22 NOTE — CARE COORDINATION
Name: Jace Banerjee    : 1990    Discharge Date:   Primary Auth/Cert #: [id number]    Destination: Patient discharged with mother to private residence     Discharge Medications:      Medication List      START taking these medications    risperiDONE 2 MG tablet  Commonly known as: RISPERDAL  Take 1 tablet by mouth 2 times daily  Notes to patient: thoughts        STOP taking these medications    cephALEXin 250 MG capsule  Commonly known as: Katherine Duval           Where to Get Your Medications      These medications were sent to [address] [facility] [phone] - [phone]  [facility], [address] [place] [address]    Phone: [phone]   · risperiDONE 2 MG tablet         Follow Up Appointment: Pt to be discharged home via father pick-up  [address] [address]      On 2021      Follow-up  Pt and pt's family stated that they will be following up with medication with Zoila Archer provider. Stated they will make appointments.

## 2021-02-23 NOTE — TELEPHONE ENCOUNTER
I have spoken to the patient on the phone. She states that she has dry eyes and arm stiffness from risperidone. She wants to try a lower dose. We agreed that she can cut the medication down to 2 mg at nighttime only.     Veronique Dawson

## 2022-08-03 NOTE — DISCHARGE INSTRUCTIONS
You have had lesions removed today under local anesthesia. You may have some pain at the surgery site after the procedure. You should avoid aspirin products and over the counter products  for 3 days. Keep areas clean and dry. No strenuous activity for 24  HOURS     No swimming, no tub baths,no hot tubs    Eat lightly at first, advance diet as tolerated. Drink plenty of fluids. Keep it covered with a sterile bandage until follow up appointment     Steri-strips  will fall off on their own in about a week. You may shower 24 hours after the procedure. Pat the wound dry after you have washed it with a mild soap. Do not submerge the wound in water until it is well-healed. Take pain medication as directed, if necessary per instructions. Complete ALL antibiotics as directed for entire duration of therapy. Stitches will be left in the skin until your follow up appointment.      Call Your Doctor if any of the following occurs:     Signs of infection, including fever and chills   Redness, swelling, increasing pain, excessive bleeding, or discharge from the incision site   Pain that you cannot control with the medicines you have been given   Any new symptoms

## 2022-08-05 NOTE — PRE-PROCEDURE INSTRUCTIONS
VM left with pre-procedure instructions:     Date/time/location of surgery/procedure     OK to have light breakfast.

## 2022-08-08 ENCOUNTER — HOSPITAL ENCOUNTER (OUTPATIENT)
Age: 32
Setting detail: OUTPATIENT SURGERY
Discharge: HOME | End: 2022-08-08
Attending: PLASTIC SURGERY | Admitting: PLASTIC SURGERY
Payer: MEDICAID

## 2022-08-08 VITALS
WEIGHT: 255.38 LBS | TEMPERATURE: 97.2 F | DIASTOLIC BLOOD PRESSURE: 77 MMHG | HEART RATE: 58 BPM | BODY MASS INDEX: 34.59 KG/M2 | HEIGHT: 72 IN | RESPIRATION RATE: 21 BRPM | OXYGEN SATURATION: 99 % | SYSTOLIC BLOOD PRESSURE: 116 MMHG

## 2022-08-08 DIAGNOSIS — D48.5 NEOPLASM OF UNCERTAIN BEHAVIOR OF SKIN: ICD-10-CM

## 2022-08-08 PROCEDURE — 7100000011 HC PHASE II RECOVERY - ADDTL 15 MIN: Performed by: PLASTIC SURGERY

## 2022-08-08 PROCEDURE — 88305 TISSUE EXAM BY PATHOLOGIST: CPT

## 2022-08-08 PROCEDURE — 2500000003 HC RX 250 WO HCPCS: Performed by: PLASTIC SURGERY

## 2022-08-08 PROCEDURE — 3600000012 HC SURGERY LEVEL 2 ADDTL 15MIN: Performed by: PLASTIC SURGERY

## 2022-08-08 PROCEDURE — 2709999900 HC NON-CHARGEABLE SUPPLY: Performed by: PLASTIC SURGERY

## 2022-08-08 PROCEDURE — 3600000002 HC SURGERY LEVEL 2 BASE: Performed by: PLASTIC SURGERY

## 2022-08-08 PROCEDURE — 7100000010 HC PHASE II RECOVERY - FIRST 15 MIN: Performed by: PLASTIC SURGERY

## 2022-08-08 ASSESSMENT — PAIN - FUNCTIONAL ASSESSMENT: PAIN_FUNCTIONAL_ASSESSMENT: NONE - DENIES PAIN

## 2022-08-08 NOTE — BRIEF OP NOTE
Brief Postoperative Note      Patient: Leonel Anthony  YOB: 1990  MRN: [id number]    Date of Procedure: 8/8/2022    Pre-Op Diagnosis: BACK LESION    Post-Op Diagnosis: Same       Procedure(s):  BACK LESION BIOPSY EXCISION    Surgeon(s):  Jerry Garrido MD    Assistant:  Resident: Warren Ervin DPM    Anesthesia: Local    Estimated Blood Loss (mL): Minimal    Complications: None    Specimens:   ID Type Source Tests Collected by Time Destination   A : BACK LESION Tissue Tissue SURGICAL PATHOLOGY Jerry Garrido MD 8/8/2022 1044        Implants:  * No implants in log *      Drains: * No LDAs found *    Findings:     Electronically signed by Jerry Garrido MD on 8/8/2022 at 10:53 AM

## 2022-08-08 NOTE — H&P
Subjective:      Patient ID: Janae Jarrett is a 29 y.o. female. HPI  Patient presents today to have a lesion on her back excised. Medical History      Medical History    Diagnosis Date Comment Source   Acute psychosis ([facility] [address] 02/17/2021     Kidney stones      Sickle cell trait ([facility] [address]           Other Medical History    Diagnosis Date Comment Source   Body piercing  ears: helix and lobes    Hypokalemia 02/17/2021     Wears glasses             Family History    Problem Relation Age of Onset Comments   Breast Cancer Maternal Grandmother     Heart Disease Maternal Grandmother     Lung Cancer Maternal Grandmother         Social History      Tobacco History    Smoking Status   Never   Smokeless Tobacco Use   Never     Alcohol History    Alcohol Use Status   No     Drug Use    Drug Use Status   Not Currently Types   Marijuana (Weed) Comment   occasional marijuana smoking or eating     Sexual Activity    Sexually Active   Yes Partners   Male        Surgical History    Procedure Laterality Date Comment Source   FRENULECTOMY   tongue procedure    HAND SURGERY Left 2010 removal of cyst of dorsum of hand    LIPOSUCTION  12/04/2020 ABD        E-cigarette/Vaping    Questions Responses   E-cigarette/Vaping Use Never User   Start Date    Passive Exposure    Quit Date    Counseling Given    Comments        Socioeconomic History      Employment History    No employment history on file. Family and Education    Marital Status   Single     Social Identity    Preferred Language Ethnicity Race   English Non- / Eliazar Hals /            No current facility-administered medications on file prior to encounter. Current Outpatient Medications on File Prior to Encounter   Medication Sig Dispense Refill    cephALEXin (KEFLEX) 250 MG capsule Take one PO QID. Start day before surgery.  12 capsule 0    risperiDONE (RISPERDAL) 2 MG tablet Take 1 tablet by mouth 2 times daily 60 tablet 3          Objective: Physical Exam  Vitals and nursing note reviewed. Exam conducted with a chaperone present. Constitutional:       Appearance: Normal appearance. HENT:     Head: Normocephalic and atraumatic. Mouth/Throat:     Mouth: Mucous membranes are moist.  Eyes:     Extraocular Movements: Extraocular movements intact. Conjunctiva/sclera: Conjunctivae normal.     Pupils: Pupils are equal, round, and reactive to light. Pulmonary:     Effort: Pulmonary effort is normal.  Musculoskeletal:       Back:    Skin:     General: Skin is warm and dry. Neurological:     General: No focal deficit present. Mental Status: She is alert and oriented to person, place, and time. Forehead mass unchanged. Patient wants to watch yet. Assessment:   Lesion left back. Plan:   Scheduled for excision. I have discussed with the patient the indication, alternatives, and the possible risks and/or complications which include but are not limited to those delineated on the consent form for the planned procedure and the anesthesia methods. All questions were answered to patient's satisfaction. Consent was reviewed and signed.

## 2022-08-09 LAB — DERMATOLOGY PATHOLOGY REPORT: NORMAL

## 2022-08-09 NOTE — OP NOTE
[address]                  [facility]                                OPERATIVE REPORT    PATIENT NAME: Justin Naidu                     :        1990  MED REC NO:   [id number]                             ROOM:  ACCOUNT NO:   [de-identified]                           ADMIT DATE: 2022  PROVIDER:     Aria Lizama    DATE OF PROCEDURE:  2022    PREOPERATIVE DIAGNOSIS:  Lesion on the left back. POSTOPERATIVE DIAGNOSIS:  Lesion on the left back. PROCEDURE:  Excision of lesion of left back (1.8 cm), taken with a 3-mm  margin and with intermediate pair of 3.0 cm length. SURGEON:  Aria Lizama MD    ANESTHESIA:  Local 1% Xylocaine with epinephrine, buffered. INDICATIONS:  The patient is a 82-year-old female who presents with an  enlarging lesion on the left side of her back, here for removal and  diagnosis. The procedure, the risks, the benefits were discussed with  her. All questions were answered to her satisfaction. Consent was  signed. NARRATIVE SUMMARY:  The patient was brought to the operating suite,  placed in the prone position. The area was prepped and draped in usual  sterile fashion. Initially, using a marker, outline was made for the  excision along with margin. Local anesthetic infiltrated. Next, with  scalpel, incision was carried out around the perimeter of the margin and  deepened through the full-thickness of the skin. The lesion was then  excised including the underlying subcutaneous tissues and sent off as  specimen. Bovie cautery used for hemostasis. Wound closed in layers  with interrupted 4-0 Vicryl in the subcutaneous and running  intracuticular 4-0 Prolene in the skin. Steri-Strips for dressing. The  patient tolerated the procedure well. Blood loss minimal.  Specimens  x1. Complications none. The patient was transferred to Recovery in  stable condition.         Dimas Ulloa

## 2023-09-08 LAB
ALANINE AMINOTRANSFERASE (SGPT) (U/L) IN SER/PLAS: 6 U/L (ref 7–45)
ALBUMIN (G/DL) IN SER/PLAS: 4.3 G/DL (ref 3.4–5)
ALKALINE PHOSPHATASE (U/L) IN SER/PLAS: 45 U/L (ref 33–110)
ANION GAP IN SER/PLAS: 15 MMOL/L (ref 10–20)
ASPARTATE AMINOTRANSFERASE (SGOT) (U/L) IN SER/PLAS: 10 U/L (ref 9–39)
BILIRUBIN TOTAL (MG/DL) IN SER/PLAS: 0.4 MG/DL (ref 0–1.2)
CALCIUM (MG/DL) IN SER/PLAS: 9.3 MG/DL (ref 8.6–10.6)
CARBON DIOXIDE, TOTAL (MMOL/L) IN SER/PLAS: 25 MMOL/L (ref 21–32)
CHLORIDE (MMOL/L) IN SER/PLAS: 104 MMOL/L (ref 98–107)
CHOLESTEROL (MG/DL) IN SER/PLAS: 220 MG/DL (ref 0–199)
CHOLESTEROL IN HDL (MG/DL) IN SER/PLAS: 62.1 MG/DL
CHOLESTEROL/HDL RATIO: 3.5
CREATININE (MG/DL) IN SER/PLAS: 0.6 MG/DL (ref 0.5–1.05)
ERYTHROCYTE DISTRIBUTION WIDTH (RATIO) BY AUTOMATED COUNT: 19.1 % (ref 11.5–14.5)
ERYTHROCYTE MEAN CORPUSCULAR HEMOGLOBIN CONCENTRATION (G/DL) BY AUTOMATED: 32.9 G/DL (ref 32–36)
ERYTHROCYTE MEAN CORPUSCULAR VOLUME (FL) BY AUTOMATED COUNT: 67 FL (ref 80–100)
ERYTHROCYTES (10*6/UL) IN BLOOD BY AUTOMATED COUNT: 3.59 X10E12/L (ref 4–5.2)
ESTIMATED AVERAGE GLUCOSE FOR HBA1C: 94 MG/DL
FERRITIN (UG/LL) IN SER/PLAS: 11 UG/L (ref 8–150)
GFR FEMALE: >90 ML/MIN/1.73M2
GLUCOSE (MG/DL) IN SER/PLAS: 85 MG/DL (ref 74–99)
HEMATOCRIT (%) IN BLOOD BY AUTOMATED COUNT: 24 % (ref 36–46)
HEMOGLOBIN (G/DL) IN BLOOD: 7.9 G/DL (ref 12–16)
HEMOGLOBIN A1C/HEMOGLOBIN TOTAL IN BLOOD: 4.9 %
IRON (UG/DL) IN SER/PLAS: <10 UG/DL (ref 35–150)
IRON BINDING CAPACITY (UG/DL) IN SER/PLAS: <421 UG/DL (ref 240–445)
IRON SATURATION (%) IN SER/PLAS: ABNORMAL % (ref 25–45)
LDL: 144 MG/DL (ref 0–99)
LEUKOCYTES (10*3/UL) IN BLOOD BY AUTOMATED COUNT: 4.7 X10E9/L (ref 4.4–11.3)
NRBC (PER 100 WBCS) BY AUTOMATED COUNT: 0 /100 WBC (ref 0–0)
PLATELETS (10*3/UL) IN BLOOD AUTOMATED COUNT: 345 X10E9/L (ref 150–450)
POTASSIUM (MMOL/L) IN SER/PLAS: 4 MMOL/L (ref 3.5–5.3)
PROTEIN TOTAL: 7.5 G/DL (ref 6.4–8.2)
SODIUM (MMOL/L) IN SER/PLAS: 140 MMOL/L (ref 136–145)
THYROTROPIN (MIU/L) IN SER/PLAS BY DETECTION LIMIT <= 0.05 MIU/L: 0.71 MIU/L (ref 0.44–3.98)
TRIGLYCERIDE (MG/DL) IN SER/PLAS: 72 MG/DL (ref 0–149)
UREA NITROGEN (MG/DL) IN SER/PLAS: 13 MG/DL (ref 6–23)
VLDL: 14 MG/DL (ref 0–40)

## 2023-11-09 ENCOUNTER — APPOINTMENT (OUTPATIENT)
Dept: PRIMARY CARE | Facility: CLINIC | Age: 33
End: 2023-11-09
Payer: MEDICAID

## 2023-12-12 ENCOUNTER — APPOINTMENT (OUTPATIENT)
Dept: PRIMARY CARE | Facility: CLINIC | Age: 33
End: 2023-12-12
Payer: MEDICAID

## 2023-12-19 ENCOUNTER — APPOINTMENT (OUTPATIENT)
Dept: PRIMARY CARE | Facility: CLINIC | Age: 33
End: 2023-12-19
Payer: MEDICAID

## 2024-02-12 ENCOUNTER — APPOINTMENT (OUTPATIENT)
Dept: PRIMARY CARE | Facility: CLINIC | Age: 34
End: 2024-02-12
Payer: MEDICAID

## 2024-03-07 ENCOUNTER — OFFICE VISIT (OUTPATIENT)
Dept: DERMATOLOGY | Facility: CLINIC | Age: 34
End: 2024-03-07
Payer: MEDICAID

## 2024-03-07 DIAGNOSIS — L91.0 KELOID: Primary | ICD-10-CM

## 2024-03-07 PROCEDURE — 99203 OFFICE O/P NEW LOW 30 MIN: CPT | Performed by: DERMATOLOGY

## 2024-03-07 ASSESSMENT — DERMATOLOGY PATIENT ASSESSMENT
DO YOU USE SUNSCREEN: OCCASIONALLY
ARE YOU ON BIRTH CONTROL: NO
DO YOU HAVE IRREGULAR MENSTRUAL CYCLES: NO
ARE YOU TRYING TO GET PREGNANT: NO
DO YOU USE A TANNING BED: NO
HAVE YOU HAD OR DO YOU HAVE A STAPH INFECTION: NO
HAVE YOU HAD OR DO YOU HAVE VASCULAR DISEASE: NO
DO YOU HAVE ANY NEW OR CHANGING LESIONS: YES
WHERE ARE THESE NEW OR CHANGING LESIONS LOCATED: BACK
ARE YOU AN ORGAN TRANSPLANT RECIPIENT: NO

## 2024-03-07 ASSESSMENT — DERMATOLOGY QUALITY OF LIFE (QOL) ASSESSMENT
WHAT SINGLE SKIN CONDITION LISTED BELOW IS THE PATIENT ANSWERING THE QUALITY-OF-LIFE ASSESSMENT QUESTIONS ABOUT: NONE OF THE ABOVE
RATE HOW BOTHERED YOU ARE BY SYMPTOMS OF YOUR SKIN PROBLEM (EG, ITCHING, STINGING BURNING, HURTING OR SKIN IRRITATION): 6 - ALWAYS BOTHERED
RATE HOW BOTHERED YOU ARE BY EFFECTS OF YOUR SKIN PROBLEMS ON YOUR ACTIVITIES (EG, GOING OUT, ACCOMPLISHING WHAT YOU WANT, WORK ACTIVITIES OR YOUR RELATIONSHIPS WITH OTHERS): 0 - NEVER BOTHERED
RATE HOW EMOTIONALLY BOTHERED YOU ARE BY YOUR SKIN PROBLEM (FOR EXAMPLE, WORRY, EMBARRASSMENT, FRUSTRATION): 6 - ALWAYS BOTHERED
DATE THE QUALITY-OF-LIFE ASSESSMENT WAS COMPLETED: 66906

## 2024-03-07 ASSESSMENT — PATIENT GLOBAL ASSESSMENT (PGA): PATIENT GLOBAL ASSESSMENT: PATIENT GLOBAL ASSESSMENT:  3 - MODERATE

## 2024-03-07 ASSESSMENT — ITCH NUMERIC RATING SCALE: HOW SEVERE IS YOUR ITCHING?: 0

## 2024-03-07 NOTE — PROGRESS NOTES
"Subjective     Amalia Tomas is a 34 y.o. female who presents for the following: Suspicious Skin Lesion (Pt here for lesion on back for a few years. Pt reports raised and painful. No tx. No hx. ). Lesion occurred spontaneously per patient. Denies trauma and surgery to the area. She wants to have lesion removed.     Review of Systems:  No other skin or systemic complaints other than what is documented elsewhere in the note.    The following portions of the chart were reviewed this encounter and updated as appropriate:          Skin Cancer History  No skin cancer on file.      Specialty Problems    None       Objective   Well appearing patient in no apparent distress; mood and affect are within normal limits.    A focused skin examination was performed of the left flank. All findings within normal limits unless otherwise noted below.    Assessment/Plan   1. Keloid  Left Flank  Shiny, thick, fibrotic brown linear plaque.    Keloids are thick, raised scars that often occur secondary to trauma, surgery, piercings and at times may occur spontaneously.  Treatments are limited, however often do respond to intralesional kenalog(ILK). Keloids often require multiple intralesional kenalog treatments  Occasionally keloids can \"removed\" surgically, however they have a risk of recurrence and still often require intralesional kenalog after removal to prevent recurrence.     Advised that the areas are not excised or removed as an initial treatment and should start with intralesional kenalog, especially in a keloid that has not previously been treated.    The patient declined treatment at this time and will follow up at her discretion.        "

## 2024-04-02 ENCOUNTER — APPOINTMENT (OUTPATIENT)
Dept: PRIMARY CARE | Facility: CLINIC | Age: 34
End: 2024-04-02
Payer: MEDICAID

## 2024-05-07 ENCOUNTER — APPOINTMENT (OUTPATIENT)
Dept: PRIMARY CARE | Facility: CLINIC | Age: 34
End: 2024-05-07
Payer: MEDICAID

## 2024-05-14 ENCOUNTER — APPOINTMENT (OUTPATIENT)
Dept: PRIMARY CARE | Facility: CLINIC | Age: 34
End: 2024-05-14
Payer: MEDICAID

## 2024-07-16 ENCOUNTER — APPOINTMENT (OUTPATIENT)
Dept: PRIMARY CARE | Facility: CLINIC | Age: 34
End: 2024-07-16
Payer: MEDICAID

## 2024-09-24 ENCOUNTER — TELEPHONE (OUTPATIENT)
Dept: ENDOCRINOLOGY | Facility: CLINIC | Age: 34
End: 2024-09-24
Payer: MEDICAID

## 2024-09-24 NOTE — TELEPHONE ENCOUNTER
Patient called concerned after recent lab work showed she had low iron, and would like to know if she could receive iron infusions.

## 2024-09-25 DIAGNOSIS — D64.9 ANEMIA, UNSPECIFIED TYPE: Primary | ICD-10-CM

## 2024-09-27 ENCOUNTER — TELEPHONE (OUTPATIENT)
Dept: HEMATOLOGY/ONCOLOGY | Facility: HOSPITAL | Age: 34
End: 2024-09-27
Payer: MEDICAID

## 2024-09-27 NOTE — TELEPHONE ENCOUNTER
Amalia Tomas would like to cancel the following appointments:     Evy Moroe in [place] [address] ([phone]), 11/1/2024  1:00 PM     Comments:    Reached out to patient regarding her my chart cancellation request. Patient states she got an appointment in [place] closer to home and no longer needs this appointment. Patient verbalized and agreed to cancellation.

## 2024-10-03 ENCOUNTER — APPOINTMENT (OUTPATIENT)
Dept: PRIMARY CARE | Facility: CLINIC | Age: 34
End: 2024-10-03
Payer: MEDICAID

## 2024-11-01 ENCOUNTER — APPOINTMENT (OUTPATIENT)
Dept: HEMATOLOGY/ONCOLOGY | Facility: HOSPITAL | Age: 34
End: 2024-11-01
Payer: MEDICAID

## 2024-11-07 ENCOUNTER — APPOINTMENT (OUTPATIENT)
Dept: PRIMARY CARE | Facility: CLINIC | Age: 34
End: 2024-11-07
Payer: MEDICAID

## 2024-11-13 ENCOUNTER — TELEPHONE (OUTPATIENT)
Dept: PRIMARY CARE | Facility: CLINIC | Age: 34
End: 2024-11-13
Payer: MEDICAID

## 2024-11-13 DIAGNOSIS — Z86.018 HISTORY OF UTERINE FIBROID: Primary | ICD-10-CM

## 2024-11-13 NOTE — TELEPHONE ENCOUNTER
Patient calling again and also left a message in mychart reagrding a referral for the uterine fibroid and would like a referral for the Ultrasound.

## 2024-12-02 ENCOUNTER — APPOINTMENT (OUTPATIENT)
Dept: RADIOLOGY | Facility: HOSPITAL | Age: 34
End: 2024-12-02
Payer: MEDICAID

## 2024-12-06 ENCOUNTER — APPOINTMENT (OUTPATIENT)
Dept: RADIOLOGY | Facility: HOSPITAL | Age: 34
End: 2024-12-06
Payer: MEDICAID

## 2024-12-31 ENCOUNTER — APPOINTMENT (OUTPATIENT)
Dept: PRIMARY CARE | Facility: CLINIC | Age: 34
End: 2024-12-31
Payer: MEDICAID

## 2025-01-15 ENCOUNTER — APPOINTMENT (OUTPATIENT)
Dept: OBSTETRICS AND GYNECOLOGY | Facility: CLINIC | Age: 35
End: 2025-01-15
Payer: MEDICAID

## 2025-06-05 ENCOUNTER — APPOINTMENT (OUTPATIENT)
Dept: DERMATOLOGY | Facility: CLINIC | Age: 35
End: 2025-06-05
Payer: MEDICAID

## (undated) DEVICE — SUTURE PROL SZ 4-0 L18IN NONABSORBABLE BLU L16MM PC-3 3/8 8634G

## (undated) DEVICE — STRIP,CLOSURE,WOUND,MEDI-STRIP,1/2X4: Brand: MEDLINE

## (undated) DEVICE — SOLUTION SURG PREP POV IOD 7.5% 4 OZ

## (undated) DEVICE — SOLUTION IV IRRIG POUR BRL 0.9% SODIUM CHL 2F7124

## (undated) DEVICE — SUTURE VCRL SZ 4-0 L18IN ABSRB UD L13MM P-3 3/8 CIR PRIM J494H

## (undated) DEVICE — MHPB GEN MINOR PACK: Brand: MEDLINE INDUSTRIES, INC.

## (undated) DEVICE — PENCIL ES L3M BTTN SWCH HOLSTER W/ BLDE ELECTRD EDGE

## (undated) DEVICE — INTENDED FOR TISSUE SEPARATION, AND OTHER PROCEDURES THAT REQUIRE A SHARP SURGICAL BLADE TO PUNCTURE OR CUT.: Brand: BARD-PARKER ® SAFETYLOCK CARBON RIB-BACK BLADES

## (undated) DEVICE — ELECTRODE PT RET AD L9FT HI MOIST COND ADH HYDRGEL CORDED

## (undated) DEVICE — GLOVE ORANGE PI 7 1/2   MSG9075